# Patient Record
Sex: MALE | Race: WHITE | NOT HISPANIC OR LATINO | Employment: FULL TIME | URBAN - METROPOLITAN AREA
[De-identification: names, ages, dates, MRNs, and addresses within clinical notes are randomized per-mention and may not be internally consistent; named-entity substitution may affect disease eponyms.]

---

## 2017-01-09 ENCOUNTER — GENERIC CONVERSION - ENCOUNTER (OUTPATIENT)
Dept: OTHER | Facility: OTHER | Age: 22
End: 2017-01-09

## 2017-02-07 ENCOUNTER — ALLSCRIPTS OFFICE VISIT (OUTPATIENT)
Dept: OTHER | Facility: OTHER | Age: 22
End: 2017-02-07

## 2017-02-07 LAB
FLUAV AG SPEC QL IA: NEGATIVE
INFLUENZA B AG (HISTORICAL): NEGATIVE

## 2017-06-26 ENCOUNTER — ALLSCRIPTS OFFICE VISIT (OUTPATIENT)
Dept: OTHER | Facility: OTHER | Age: 22
End: 2017-06-26

## 2017-08-21 ENCOUNTER — GENERIC CONVERSION - ENCOUNTER (OUTPATIENT)
Dept: OTHER | Facility: OTHER | Age: 22
End: 2017-08-21

## 2017-09-06 ENCOUNTER — ALLSCRIPTS OFFICE VISIT (OUTPATIENT)
Dept: OTHER | Facility: OTHER | Age: 22
End: 2017-09-06

## 2017-09-06 LAB — S PYO AG THROAT QL: NEGATIVE

## 2018-01-13 VITALS
DIASTOLIC BLOOD PRESSURE: 72 MMHG | OXYGEN SATURATION: 98 % | BODY MASS INDEX: 21.34 KG/M2 | HEART RATE: 81 BPM | SYSTOLIC BLOOD PRESSURE: 122 MMHG | WEIGHT: 153 LBS | TEMPERATURE: 98.3 F

## 2018-01-14 VITALS
HEART RATE: 89 BPM | RESPIRATION RATE: 15 BRPM | DIASTOLIC BLOOD PRESSURE: 70 MMHG | WEIGHT: 144 LBS | HEIGHT: 71 IN | BODY MASS INDEX: 20.16 KG/M2 | SYSTOLIC BLOOD PRESSURE: 126 MMHG | TEMPERATURE: 97.6 F

## 2018-01-14 VITALS
HEIGHT: 71 IN | TEMPERATURE: 99 F | SYSTOLIC BLOOD PRESSURE: 120 MMHG | DIASTOLIC BLOOD PRESSURE: 78 MMHG | WEIGHT: 155 LBS | OXYGEN SATURATION: 97 % | HEART RATE: 98 BPM | BODY MASS INDEX: 21.7 KG/M2 | RESPIRATION RATE: 16 BRPM

## 2018-12-03 ENCOUNTER — OFFICE VISIT (OUTPATIENT)
Dept: FAMILY MEDICINE CLINIC | Facility: OTHER | Age: 23
End: 2018-12-03
Payer: COMMERCIAL

## 2018-12-03 VITALS
WEIGHT: 152.44 LBS | DIASTOLIC BLOOD PRESSURE: 84 MMHG | HEART RATE: 64 BPM | BODY MASS INDEX: 20.65 KG/M2 | OXYGEN SATURATION: 98 % | SYSTOLIC BLOOD PRESSURE: 134 MMHG | HEIGHT: 72 IN | TEMPERATURE: 97.4 F

## 2018-12-03 DIAGNOSIS — J02.9 SORE THROAT: ICD-10-CM

## 2018-12-03 DIAGNOSIS — J06.9 VIRAL UPPER RESPIRATORY TRACT INFECTION: Primary | ICD-10-CM

## 2018-12-03 LAB — S PYO AG THROAT QL: NEGATIVE

## 2018-12-03 PROCEDURE — 3008F BODY MASS INDEX DOCD: CPT | Performed by: NURSE PRACTITIONER

## 2018-12-03 PROCEDURE — 99213 OFFICE O/P EST LOW 20 MIN: CPT | Performed by: NURSE PRACTITIONER

## 2018-12-03 PROCEDURE — 87880 STREP A ASSAY W/OPTIC: CPT | Performed by: NURSE PRACTITIONER

## 2018-12-03 NOTE — PROGRESS NOTES
Assessment/Plan:           Problem List Items Addressed This Visit     None      Visit Diagnoses     Viral upper respiratory tract infection       Sore throat      --Advise rest, fluids, gargles, lozenges, Motrin, OTC expectorant  --Declines Rx cough suppressant  --Call for no improvement/worsening over the next 5-7 days    Relevant Orders    POCT rapid strepA (Completed)-->negative            Subjective:      Patient ID: Marnie Choi is a 26 y o  male  Sore throat, mildly productive cough since yesterday  No fever, nasal congestion, rhinorrhea, headache, body aches  Normal appetite  No N/V/D, abdominal pain  No wheezing, chest tightness  No OTC meds taken  Co-worker sick with URI symptoms x 1 month  No other known sick contacts  No flu shot  Will be graduating from [employer] in a couple weeks with criminal justice degree  The following portions of the patient's history were reviewed and updated as appropriate: allergies, current medications, past family history, past medical history, past social history, past surgical history and problem list     Review of Systems   Constitutional: Negative for fever  HENT: Positive for sore throat  Negative for rhinorrhea  Respiratory: Positive for cough  Negative for shortness of breath and wheezing  Cardiovascular: Negative for chest pain  Gastrointestinal: Negative for abdominal pain, diarrhea, nausea and vomiting  Musculoskeletal: Negative for myalgias  Neurological: Negative for headaches  Objective:      /88 (BP Location: Right arm, Patient Position: Sitting, Cuff Size: Adult)   Pulse 64   Temp (!) 97 4 °F (36 3 °C) (Tympanic)   Ht 5' 11 6" (1 819 m)   Wt 69 1 kg (152 lb 7 oz)   SpO2 98%   BMI 20 91 kg/m²          Physical Exam   Constitutional: He is oriented to person, place, and time  He appears well-developed and well-nourished  No distress  HENT:   Head: Normocephalic and atraumatic     Right Ear: External ear normal    Left Ear: External ear normal    Nose: Nose normal    Mouth/Throat: No oropharyngeal exudate  Tonsils 1-2 +, mildly erythematous without exudate  Eyes: Pupils are equal, round, and reactive to light  Conjunctivae are normal  Right eye exhibits no discharge  Left eye exhibits no discharge  Neck: Normal range of motion  Neck supple  Cardiovascular: Normal rate, regular rhythm and normal heart sounds  Pulmonary/Chest: Effort normal and breath sounds normal  No respiratory distress  He has no wheezes  He has no rales  Abdominal: Soft  Bowel sounds are normal  There is no tenderness  Lymphadenopathy:     He has no cervical adenopathy  Neurological: He is alert and oriented to person, place, and time  Skin: Skin is warm and dry  No rash noted  He is not diaphoretic  Psychiatric: He has a normal mood and affect

## 2018-12-03 NOTE — PATIENT INSTRUCTIONS
Upper Respiratory Infection, Ambulatory Care   GENERAL INFORMATION:   An upper respiratory infection  is also called a common cold  It can affect your nose, throat, ears, and sinuses  Common symptoms include the following:   · Runny or stuffy nose    · Sneezing and coughing    · Sore throat or hoarseness    · Red, watery, and sore eyes    · Tiredness or restlessness    · Chills and fever    · Headache, body aches, or sore muscles  Seek immediate care for the following symptoms:   · Headaches or a stiff neck    · Bright lights hurt your eyes    · Chest pain or trouble breathing  Treatment for an upper respiratory infection  may include any of the following:  · Decongestants  help decrease nasal congestion and improve your breathing  Do not use decongestant sprays for more than a few days  · Cough suppressants  help decrease coughing  Ask your healthcare provider which type of cough medicine is best for you  Some cough medicines need a doctor's order  · NSAIDs  help decrease swelling and pain or fever  This medicine is available with or without a doctor's order  NSAIDs can cause stomach bleeding or kidney problems in certain people  If you take blood thinner medicine, always ask your healthcare provider if NSAIDs are safe for you  Always read the medicine label and follow directions  Care for an upper respiratory infection:   · Rest  until your fever is gone or you feel better  · Drink liquids as directed to prevent dehydration  You may need to drink 8 to 10 cups of liquid each day  Good liquids to drink include water, ginger ale, tea, or fruit juices  · Gargle  with warm salt water to help your sore throat feel better  Mix ¼ teaspoon salt with 1 cup warm water  You may also suck on hard candy or throat lozenges  · Saline nasal drops  help loosen your nasal congestion  They can be bought without a doctor's order      · Take a warm bath or shower  to help decrease body aches and help you breathe easier  · Use a cool-mist humidifier  to increase air moisture and make it easier for you to breathe  Prevent the spread of germs:   · Avoid others for the first 2 to 3 days of your cold  Germs are easily spread during this time  · Do not share food, drinks,  towels, or personal items with others  · Wash your hands often  Use soap and water  Wash your hands after you use the bathroom, change a child's diapers, or sneeze  Wash your hands before you prepare or eat food  Cover your mouth and nose with a tissue when you sneeze or cough  Follow up with your healthcare provider as directed:  Write down your questions so you remember to ask them during your visits  CARE AGREEMENT:   You have the right to help plan your care  Learn about your health condition and how it may be treated  Discuss treatment options with your caregivers to decide what care you want to receive  You always have the right to refuse treatment  The above information is an  only  It is not intended as medical advice for individual conditions or treatments  Talk to your doctor, nurse or pharmacist before following any medical regimen to see if it is safe and effective for you  © 2014 [address] is for End User's use only and may not be sold, redistributed or otherwise used for commercial purposes  All illustrations and images included in CareNotes® are the copyrighted property of [employer]  or Kevin Lamar

## 2019-08-30 ENCOUNTER — OFFICE VISIT (OUTPATIENT)
Dept: FAMILY MEDICINE CLINIC | Facility: OTHER | Age: 24
End: 2019-08-30
Payer: COMMERCIAL

## 2019-08-30 VITALS
DIASTOLIC BLOOD PRESSURE: 82 MMHG | SYSTOLIC BLOOD PRESSURE: 120 MMHG | BODY MASS INDEX: 20.78 KG/M2 | HEIGHT: 72 IN | OXYGEN SATURATION: 98 % | WEIGHT: 153.4 LBS | TEMPERATURE: 97.9 F | HEART RATE: 64 BPM

## 2019-08-30 DIAGNOSIS — Z23 ENCOUNTER FOR IMMUNIZATION: ICD-10-CM

## 2019-08-30 DIAGNOSIS — Z00.00 WELL ADULT EXAM: Primary | ICD-10-CM

## 2019-08-30 PROCEDURE — 90471 IMMUNIZATION ADMIN: CPT | Performed by: NURSE PRACTITIONER

## 2019-08-30 PROCEDURE — 90715 TDAP VACCINE 7 YRS/> IM: CPT | Performed by: NURSE PRACTITIONER

## 2019-08-30 PROCEDURE — 99395 PREV VISIT EST AGE 18-39: CPT | Performed by: NURSE PRACTITIONER

## 2019-08-30 RX ORDER — PREDNISONE 10 MG/1
TABLET ORAL
COMMUNITY
Start: 2019-08-04 | End: 2019-08-30

## 2019-08-30 NOTE — PROGRESS NOTES
Assessment/Plan:         Problem List Items Addressed This Visit     None      Visit Diagnoses     Well adult exam     --Fairly healthy 22year old male  --Physical agility form completed-->no limitations/restrictions  --Encouraged cessation of vaping  Potential health issues discussed  He will consider  --Offered slip for baseline screening labs which he is declining  --Tdap booster given      Encounter for immunization        Relevant Orders    TDAP VACCINE GREATER THAN OR EQUAL TO 6YO IM (Completed)            Subjective:      Patient ID: Curt Ramirez is a 22 y o  male  Here for routine well exam      No complaints or issues  Graduated from [employer] this past winter--criminal justice degree  Living at home with parents  Plans on getting apartment with college friend eventually  In the process of applying for position with [employer]  Will be taking physical agility test tomorrow  Needs form completed  Test involves going up stairs, lifting 100 pounds, other basic cardio/muscle activities  He does not anticipate having any issues with anything  Denies recent injuries or physical limitations  Wrist injury when younger  Geovanny now  Works out regularly--mix of cardio and [employer]  Good stamina  Denies exertional dyspnea, CP, dizziness, palpitations  No history of asthma  Rates his diet as "average"  Eats wide variety including regular fruits and vegetables  Some yogurt and cheese  Drinks water, Gatorade  No caffeine  Couple of beers on the weekend  No other alcohol  Vapes (nicotine only), but trying to cut down, with the goal of eventually quitting  No cigarettes  No recreational drug use  Sexually active  Has steady girlfriend  No issues with STI's  Uses protection  No mood issues  Denies feeling down/depressed  No sleep issues including apnea  FH reviewed  Mother healthy    Father with CAD/valve disease (no MI; doing OK); sister recently diagnosed with seizures  He had one seizure after giving blood in 5th grade, no activity since  Was subsequently cleared by neurologist      No vision or hearing issues  Tdap 2007  The following portions of the patient's history were reviewed and updated as appropriate: current medications, past family history, past medical history, past social history, past surgical history and problem list     Review of Systems   Constitutional: Negative for fatigue and fever  HENT: Negative for sore throat  Eyes: Negative for visual disturbance  Respiratory: Negative for cough and shortness of breath  Cardiovascular: Negative for chest pain and palpitations  Gastrointestinal: Negative for abdominal pain, constipation, diarrhea and vomiting  Genitourinary: Negative for difficulty urinating and dysuria  Musculoskeletal: Negative for arthralgias and gait problem  Skin: Negative for rash  Neurological: Negative for dizziness and headaches  Psychiatric/Behavioral: Negative for dysphoric mood  Objective:      /82 (BP Location: Left arm, Patient Position: Sitting, Cuff Size: Adult)   Pulse 64   Temp 97 9 °F (36 6 °C) (Tympanic)   Ht 5' 11 5" (1 816 m)   Wt 69 6 kg (153 lb 6 4 oz)   SpO2 98%   BMI 21 10 kg/m²          Physical Exam   Constitutional: He is oriented to person, place, and time  He appears well-developed and well-nourished  HENT:   Head: Normocephalic and atraumatic  Right Ear: External ear normal    Left Ear: External ear normal    Nose: Nose normal    Mouth/Throat: Oropharynx is clear and moist    Eyes: Pupils are equal, round, and reactive to light  Conjunctivae are normal    Neck: Normal range of motion  Neck supple  Cardiovascular: Normal rate, regular rhythm, normal heart sounds and intact distal pulses  Pulmonary/Chest: Effort normal and breath sounds normal    Abdominal: Soft  Bowel sounds are normal  There is no tenderness   Hernia confirmed negative in the right inguinal area and confirmed negative in the left inguinal area  Genitourinary: Testes normal and penis normal    Lymphadenopathy:     He has no cervical adenopathy  Neurological: He is alert and oriented to person, place, and time  He has normal reflexes  Skin: Skin is warm and dry  Psychiatric: He has a normal mood and affect

## 2019-08-30 NOTE — PATIENT INSTRUCTIONS
Wellness Visit for Adults   WHAT YOU NEED TO KNOW:   What is a wellness visit? A wellness visit is when you see your healthcare provider to get screened for health problems  You can also get advice on how to stay healthy  Write down your questions so you remember to ask them  Ask your healthcare provider how often you should have a wellness visit  What happens at a wellness visit? Your healthcare provider will ask about your health, and your family history of health problems  This includes high blood pressure, heart disease, and cancer  He or she will ask if you have symptoms that concern you, if you smoke, and about your mood  You may also be asked about your intake of medicines, supplements, food, and alcohol  Any of the following may be done:  · Your weight  will be checked  Your height may also be checked so your body mass index (BMI) can be calculated  Your BMI shows if you are at a healthy weight  · Your blood pressure  and heart rate will be checked  Your temperature may also be checked  · Blood and urine tests  may be done  Blood tests may be done to check your cholesterol levels  Abnormal cholesterol levels increase your risk for heart disease and stroke  You may also need a blood or urine test to check for diabetes if you are at increased risk  Urine tests may be done to look for signs of an infection or kidney disease  · A physical exam  includes checking your heartbeat and lungs with a stethoscope  Your healthcare provider may also check your skin to look for sun damage  · Screening tests  may be recommended  A screening test is done to check for diseases that may not cause symptoms  The screening tests you may need depend on your age, gender, family history, and lifestyle habits  For example, colorectal screening may be recommended if you are 48years old or older  What screening tests do I need if I am a woman? · A Pap smear  is used to screen for cervical cancer   Pap smears are usually done every 3 to 5 years depending on your age  You may need them more often if you have had abnormal Pap smear test results in the past  Ask your healthcare provider how often you should have a Pap smear  · A mammogram  is an x-ray of your breasts to screen for breast cancer  Experts recommend mammograms every 2 years starting at age 48 years  You may need a mammogram at age 52 years or younger if you have an increased risk for breast cancer  Talk to your healthcare provider about when you should start having mammograms and how often you need them  What vaccines might I need? · Get an influenza vaccine  every year  The influenza vaccine protects you from the flu  Several types of viruses cause the flu  The viruses change over time, so new vaccines are made each year  · Get a tetanus-diphtheria (Td) booster vaccine  every 10 years  This vaccine protects you against tetanus and diphtheria  Tetanus is a severe infection that may cause painful muscle spasms and lockjaw  Diphtheria is a severe bacterial infection that causes a thick covering in the back of your mouth and throat  · Get a human papillomavirus (HPV) vaccine  if you are female and aged 23 to 32 or male 23 to 24 and never received it  This vaccine protects you from HPV infection  HPV is the most common infection spread by sexual contact  HPV may also cause vaginal, penile, and anal cancers  · Get a pneumococcal vaccine  if you are aged 72 years or older  The pneumococcal vaccine is an injection given to protect you from pneumococcal disease  Pneumococcal disease is an infection caused by pneumococcal bacteria  The infection may cause pneumonia, meningitis, or an ear infection  · Get a shingles vaccine  if you are aged 61 or older, even if you have had shingles before  The shingles vaccine is an injection to protect you from the varicella-zoster virus  This is the same virus that causes chickenpox   Shingles is a painful rash that develops in people who had chickenpox or have been exposed to the virus  How can I eat healthy? My Plate is a model for planning healthy meals  It shows the types and amounts of foods that should go on your plate  Fruits and vegetables make up about half of your plate, and grains and protein make up the other half  A serving of dairy is included on the side of your plate  The amount of calories and serving sizes you need depends on your age, gender, weight, and height  Examples of healthy foods are listed below:  · Eat a variety of vegetables  such as dark green, red, and orange vegetables  You can also include canned vegetables low in sodium (salt) and frozen vegetables without added butter or sauces  · Eat a variety of fresh fruits , canned fruit in 100% juice, frozen fruit, and dried fruit  · Include whole grains  At least half of the grains you eat should be whole grains  Examples include whole-wheat bread, wheat pasta, brown rice, and whole-grain cereals such as oatmeal     · Eat a variety of protein foods such as seafood (fish and shellfish), lean meat, and poultry without skin (turkey and chicken)  Examples of lean meats include pork leg, shoulder, or tenderloin, and beef round, sirloin, tenderloin, and extra lean ground beef  Other protein foods include eggs and egg substitutes, beans, peas, soy products, nuts, and seeds  · Choose low-fat dairy products such as skim or 1% milk or low-fat yogurt, cheese, and cottage cheese  · Limit unhealthy fats  such as butter, hard margarine, and shortening  How much exercise do I need? Exercise at least 30 minutes per day on most days of the week  Some examples of exercise include walking, biking, dancing, and swimming  You can also fit in more physical activity by taking the stairs instead of the elevator or parking farther away from stores  Include muscle strengthening activities 2 days each week  Regular exercise provides many health benefits  It helps you manage your weight, and decreases your risk for type 2 diabetes, heart disease, stroke, and high blood pressure  Exercise can also help improve your mood  Ask your healthcare provider about the best exercise plan for you  What are some general health and safety guidelines I should follow? · Do not smoke  Nicotine and other chemicals in cigarettes and cigars can cause lung damage  Ask your healthcare provider for information if you currently smoke and need help to quit  E-cigarettes or smokeless tobacco still contain nicotine  Talk to your healthcare provider before you use these products  · Limit alcohol  A drink of alcohol is 12 ounces of beer, 5 ounces of wine, or 1½ ounces of liquor  · Lose weight, if needed  Being overweight increases your risk of certain health conditions  These include heart disease, high blood pressure, type 2 diabetes, and certain types of cancer  · Protect your skin  Do not sunbathe or use tanning beds  Use sunscreen with a SPF 15 or higher  Apply sunscreen at least 15 minutes before you go outside  Reapply sunscreen every 2 hours  Wear protective clothing, hats, and sunglasses when you are outside  · Drive safely  Always wear your seatbelt  Make sure everyone in your car wears a seatbelt  A seatbelt can save your life if you are in an accident  Do not use your cell phone when you are driving  This could distract you and cause an accident  Pull over if you need to make a call or send a text message  · Practice safe sex  Use latex condoms if are sexually active and have more than one partner  Your healthcare provider may recommend screening tests for sexually transmitted infections (STIs)  · Wear helmets, lifejackets, and protective gear  Always wear a helmet when you ride a bike or motorcycle, go skiing, or play sports that could cause a head injury  Wear protective equipment when you play sports   Wear a lifejacket when you are on a boat or doing water sports  CARE AGREEMENT:   You have the right to help plan your care  Learn about your health condition and how it may be treated  Discuss treatment options with your caregivers to decide what care you want to receive  You always have the right to refuse treatment  The above information is an  only  It is not intended as medical advice for individual conditions or treatments  Talk to your doctor, nurse or pharmacist before following any medical regimen to see if it is safe and effective for you  © 2017 [address] is for End User's use only and may not be sold, redistributed or otherwise used for commercial purposes  All illustrations and images included in CareNotes® are the copyrighted property of [employer]  or Kevin Lamar

## 2020-09-26 ENCOUNTER — HOSPITAL ENCOUNTER (EMERGENCY)
Facility: HOSPITAL | Age: 25
Discharge: HOME | End: 2020-09-26
Attending: EMERGENCY MEDICINE
Payer: OTHER MISCELLANEOUS

## 2020-09-26 VITALS
OXYGEN SATURATION: 100 % | DIASTOLIC BLOOD PRESSURE: 97 MMHG | RESPIRATION RATE: 16 BRPM | TEMPERATURE: 98.7 F | HEART RATE: 62 BPM | HEIGHT: 72 IN | SYSTOLIC BLOOD PRESSURE: 165 MMHG | BODY MASS INDEX: 23.32 KG/M2 | WEIGHT: 172.18 LBS

## 2020-09-26 DIAGNOSIS — R11.2 NAUSEA & VOMITING: Primary | ICD-10-CM

## 2020-09-26 PROCEDURE — 99284 EMERGENCY DEPT VISIT MOD MDM: CPT | Performed by: PHYSICIAN ASSISTANT

## 2020-09-26 PROCEDURE — 99283 EMERGENCY DEPT VISIT LOW MDM: CPT

## 2020-09-26 RX ORDER — ONDANSETRON 4 MG/1
4 TABLET, ORALLY DISINTEGRATING ORAL EVERY 6 HOURS PRN
Qty: 12 TABLET | Refills: 0 | Status: SHIPPED | OUTPATIENT
Start: 2020-09-26

## 2020-12-28 ENCOUNTER — TELEPHONE (OUTPATIENT)
Dept: FAMILY MEDICINE CLINIC | Facility: OTHER | Age: 25
End: 2020-12-28

## 2020-12-28 ENCOUNTER — APPOINTMENT (EMERGENCY)
Dept: RADIOLOGY | Facility: HOSPITAL | Age: 25
End: 2020-12-28
Payer: COMMERCIAL

## 2020-12-28 ENCOUNTER — HOSPITAL ENCOUNTER (EMERGENCY)
Facility: HOSPITAL | Age: 25
Discharge: HOME | End: 2020-12-28
Attending: EMERGENCY MEDICINE | Admitting: EMERGENCY MEDICINE
Payer: COMMERCIAL

## 2020-12-28 VITALS
RESPIRATION RATE: 17 BRPM | DIASTOLIC BLOOD PRESSURE: 69 MMHG | BODY MASS INDEX: 21.62 KG/M2 | WEIGHT: 159.6 LBS | HEIGHT: 72 IN | TEMPERATURE: 98.1 F | OXYGEN SATURATION: 97 % | HEART RATE: 61 BPM | SYSTOLIC BLOOD PRESSURE: 135 MMHG

## 2020-12-28 DIAGNOSIS — Z03.818 ENCOUNTER FOR OBSERVATION FOR SUSPECTED EXPOSURE TO OTHER BIOLOGICAL AGENTS RULED OUT: ICD-10-CM

## 2020-12-28 DIAGNOSIS — B34.9 VIRAL INFECTION, UNSPECIFIED: ICD-10-CM

## 2020-12-28 DIAGNOSIS — R07.9 CHEST PAIN: Primary | ICD-10-CM

## 2020-12-28 DIAGNOSIS — R19.7 DIARRHEA: ICD-10-CM

## 2020-12-28 LAB
ANION GAP SERPL CALCULATED.3IONS-SCNC: 8 MMOL/L (ref 4–13)
BASOPHILS # BLD AUTO: 0.04 THOUSANDS/ΜL (ref 0–0.1)
BASOPHILS NFR BLD AUTO: 1 % (ref 0–1)
BUN SERPL-MCNC: 15 MG/DL (ref 6–20)
CALCIUM SERPL-MCNC: 9.9 MG/DL (ref 8.4–10.2)
CHLORIDE SERPL-SCNC: 101 MMOL/L (ref 96–108)
CO2 SERPL-SCNC: 29 MMOL/L (ref 22–33)
CREAT SERPL-MCNC: 1.08 MG/DL (ref 0.5–1.2)
EOSINOPHIL # BLD AUTO: 0.13 THOUSAND/ΜL (ref 0–0.61)
EOSINOPHIL NFR BLD AUTO: 2 % (ref 0–6)
ERYTHROCYTE [DISTWIDTH] IN BLOOD BY AUTOMATED COUNT: 13.2 % (ref 11.6–15.1)
GFR SERPL CREATININE-BSD FRML MDRD: 96 ML/MIN/1.73SQ M
GLUCOSE SERPL-MCNC: 94 MG/DL (ref 65–140)
HCT VFR BLD AUTO: 47.7 % (ref 36.5–49.3)
HGB BLD-MCNC: 16.4 G/DL (ref 12–17)
IMM GRANULOCYTES # BLD AUTO: 0 THOUSAND/UL (ref 0–0.2)
IMM GRANULOCYTES NFR BLD AUTO: 0 % (ref 0–2)
LYMPHOCYTES # BLD AUTO: 1.43 THOUSANDS/ΜL (ref 0.6–4.47)
LYMPHOCYTES NFR BLD AUTO: 22 % (ref 14–44)
MCH RBC QN AUTO: 29.6 PG (ref 26.8–34.3)
MCHC RBC AUTO-ENTMCNC: 34.4 G/DL (ref 31.4–37.4)
MCV RBC AUTO: 86 FL (ref 82–98)
MONOCYTES # BLD AUTO: 0.6 THOUSAND/ΜL (ref 0.17–1.22)
MONOCYTES NFR BLD AUTO: 9 % (ref 4–12)
NEUTROPHILS # BLD AUTO: 4.31 THOUSANDS/ΜL (ref 1.85–7.62)
NEUTS SEG NFR BLD AUTO: 66 % (ref 43–75)
PLATELET # BLD AUTO: 214 THOUSANDS/UL (ref 149–390)
PMV BLD AUTO: 12 FL (ref 8.9–12.7)
POTASSIUM SERPL-SCNC: 4.1 MMOL/L (ref 3.5–5)
RBC # BLD AUTO: 5.54 MILLION/UL (ref 3.88–5.62)
SODIUM SERPL-SCNC: 138 MMOL/L (ref 133–145)
TROPONIN I SERPL-MCNC: <0.03 NG/ML (ref 0–0.07)
WBC # BLD AUTO: 6.51 THOUSAND/UL (ref 4.31–10.16)

## 2020-12-28 PROCEDURE — 71046 X-RAY EXAM CHEST 2 VIEWS: CPT

## 2020-12-28 PROCEDURE — 80048 BASIC METABOLIC PNL TOTAL CA: CPT | Performed by: EMERGENCY MEDICINE

## 2020-12-28 PROCEDURE — 96375 TX/PRO/DX INJ NEW DRUG ADDON: CPT

## 2020-12-28 PROCEDURE — 36415 COLL VENOUS BLD VENIPUNCTURE: CPT | Performed by: EMERGENCY MEDICINE

## 2020-12-28 PROCEDURE — 96374 THER/PROPH/DIAG INJ IV PUSH: CPT

## 2020-12-28 PROCEDURE — 96361 HYDRATE IV INFUSION ADD-ON: CPT

## 2020-12-28 PROCEDURE — 99285 EMERGENCY DEPT VISIT HI MDM: CPT | Performed by: EMERGENCY MEDICINE

## 2020-12-28 PROCEDURE — 93005 ELECTROCARDIOGRAM TRACING: CPT

## 2020-12-28 PROCEDURE — 84484 ASSAY OF TROPONIN QUANT: CPT | Performed by: EMERGENCY MEDICINE

## 2020-12-28 PROCEDURE — 85025 COMPLETE CBC W/AUTO DIFF WBC: CPT | Performed by: EMERGENCY MEDICINE

## 2020-12-28 PROCEDURE — 99285 EMERGENCY DEPT VISIT HI MDM: CPT

## 2020-12-28 RX ORDER — ONDANSETRON 2 MG/ML
4 INJECTION INTRAMUSCULAR; INTRAVENOUS ONCE
Status: COMPLETED | OUTPATIENT
Start: 2020-12-28 | End: 2020-12-28

## 2020-12-28 RX ORDER — KETOROLAC TROMETHAMINE 30 MG/ML
30 INJECTION, SOLUTION INTRAMUSCULAR; INTRAVENOUS ONCE
Status: COMPLETED | OUTPATIENT
Start: 2020-12-28 | End: 2020-12-28

## 2020-12-28 RX ORDER — SODIUM CHLORIDE 9 MG/ML
3 INJECTION INTRAVENOUS
Status: DISCONTINUED | OUTPATIENT
Start: 2020-12-28 | End: 2020-12-28 | Stop reason: HOSPADM

## 2020-12-28 RX ADMIN — SODIUM CHLORIDE 1000 ML: 0.9 INJECTION, SOLUTION INTRAVENOUS at 13:40

## 2020-12-28 RX ADMIN — ONDANSETRON 4 MG: 2 INJECTION INTRAMUSCULAR; INTRAVENOUS at 13:40

## 2020-12-28 RX ADMIN — KETOROLAC TROMETHAMINE 30 MG: 30 INJECTION, SOLUTION INTRAMUSCULAR at 13:40

## 2020-12-30 LAB
ATRIAL RATE: 56 BPM
P AXIS: -12 DEGREES
PR INTERVAL: 133 MS
QRS AXIS: 81 DEGREES
QRSD INTERVAL: 92 MS
QT INTERVAL: 384 MS
QTC INTERVAL: 371 MS
T WAVE AXIS: 60 DEGREES
VENTRICULAR RATE: 56 BPM

## 2020-12-30 PROCEDURE — 93010 ELECTROCARDIOGRAM REPORT: CPT | Performed by: INTERNAL MEDICINE

## 2022-08-30 ENCOUNTER — HOSPITAL ENCOUNTER (EMERGENCY)
Facility: HOSPITAL | Age: 27
Discharge: HOME | End: 2022-08-30
Attending: EMERGENCY MEDICINE
Payer: COMMERCIAL

## 2022-08-30 VITALS
RESPIRATION RATE: 18 BRPM | OXYGEN SATURATION: 100 % | SYSTOLIC BLOOD PRESSURE: 159 MMHG | DIASTOLIC BLOOD PRESSURE: 92 MMHG | HEART RATE: 75 BPM | TEMPERATURE: 98.2 F

## 2022-08-30 DIAGNOSIS — N48.89 PENILE PAIN: Primary | ICD-10-CM

## 2022-08-30 LAB
BILIRUB UR QL STRIP: NEGATIVE
C TRACH DNA SPEC QL NAA+PROBE: NEGATIVE
CLARITY UR: CLEAR
COLOR UR: YELLOW
GLUCOSE UR STRIP-MCNC: NEGATIVE MG/DL
HGB UR QL STRIP.AUTO: NEGATIVE
KETONES UR STRIP-MCNC: ABNORMAL MG/DL
LEUKOCYTE ESTERASE UR QL STRIP: NEGATIVE
N GONORRHOEA DNA SPEC QL NAA+PROBE: NEGATIVE
NITRITE UR QL STRIP: NEGATIVE
PH UR STRIP.AUTO: 6 [PH]
PROT UR STRIP-MCNC: NEGATIVE MG/DL
SP GR UR STRIP.AUTO: >=1.03 (ref 1–1.03)
UROBILINOGEN UR QL STRIP.AUTO: 0.2 E.U./DL

## 2022-08-30 PROCEDURE — 87491 CHLMYD TRACH DNA AMP PROBE: CPT | Performed by: EMERGENCY MEDICINE

## 2022-08-30 PROCEDURE — 99284 EMERGENCY DEPT VISIT MOD MDM: CPT | Performed by: EMERGENCY MEDICINE

## 2022-08-30 PROCEDURE — 99283 EMERGENCY DEPT VISIT LOW MDM: CPT

## 2022-08-30 PROCEDURE — 81003 URINALYSIS AUTO W/O SCOPE: CPT | Performed by: EMERGENCY MEDICINE

## 2022-08-30 PROCEDURE — 87591 N.GONORRHOEAE DNA AMP PROB: CPT | Performed by: EMERGENCY MEDICINE

## 2022-08-30 NOTE — DISCHARGE INSTRUCTIONS
Follow up with urology and with your primary doctor, and return to the emergency department for new or worsening symptoms

## 2022-08-30 NOTE — ED PROVIDER NOTES
History  Chief Complaint   Patient presents with    Penis / Scrotum Problem     Pt states he went to urgent care for a penile lesion and was - for gonorrhea, chylmydia and a uti  Pt states he took 10 days of an antiobiotic and is still having irritation on the tip of his penis  Patient is a 27-year-old male seen in the emergency department with concern for penile discomfort/skin changes over approximately the past 1-2 weeks  Patient states that he was seen at urgent care earlier this month, and apparently was treated with ceftriaxone and doxycycline  Patient states that he tested negative for gonorrhea/chlamydia at that time  Patient states he noticed a white spot and red spot on his penis within the past several days, which has been improving  Patient states that he did notes some penile discomfort, now improved  Patient notes no fever or urethral discharge  Patient states that he has not been sexually active for several months  Patient notes no difficulty with urination  None       Past Medical History:   Diagnosis Date    Irregular heart beat     Viral warts     Resolved 8/6/2014        History reviewed  No pertinent surgical history  Family History   Problem Relation Age of Onset    Hypertension Father     Heart disease Father     Hyperlipidemia Father         Pure hypercholesterolemia     Colon cancer Maternal Grandfather      I have reviewed and agree with the history as documented  E-Cigarette/Vaping    E-Cigarette Use Current Every Day User      E-Cigarette/Vaping Substances    Nicotine Yes      Social History     Tobacco Use    Smoking status: Former Smoker    Smokeless tobacco: Former User    Tobacco comment: vaping   Vaping Use    Vaping Use: Every day    Substances: Nicotine   Substance Use Topics    Alcohol use: Yes     Comment: occational    Drug use: Yes     Types: Marijuana       Review of Systems   Constitutional: Negative for chills and fever     HENT: Negative for ear pain and sore throat  Eyes: Negative for pain and visual disturbance  Respiratory: Negative for cough and shortness of breath  Cardiovascular: Negative for chest pain and palpitations  Gastrointestinal: Negative for abdominal pain and vomiting  Genitourinary: Positive for penile pain  Negative for decreased urine volume and dysuria  Penile lesion   Musculoskeletal: Negative for arthralgias and back pain  Skin: Negative for color change and rash  Neurological: Negative for seizures and syncope  Psychiatric/Behavioral: Negative for agitation and confusion  Physical Exam  Physical Exam  Vitals and nursing note reviewed  Constitutional:       General: He is not in acute distress  Appearance: He is well-developed  HENT:      Head: Normocephalic and atraumatic  Right Ear: External ear normal       Left Ear: External ear normal       Nose: Nose normal       Mouth/Throat:      Pharynx: Oropharynx is clear  Eyes:      General: No scleral icterus  Conjunctiva/sclera: Conjunctivae normal    Cardiovascular:      Rate and Rhythm: Normal rate  Comments: well-perfused extremities  Pulmonary:      Effort: Pulmonary effort is normal  No respiratory distress  Abdominal:      General: There is no distension  Palpations: Abdomen is soft  Tenderness: There is no abdominal tenderness  Genitourinary:     Comments: (chaperoned by RN)- no external lesions or ulcerations; no urethral discharge noted; no scrotal swelling or tenderness noted  Musculoskeletal:         General: No deformity or signs of injury  Cervical back: Normal range of motion and neck supple  Skin:     General: Skin is warm and dry  Neurological:      Mental Status: He is alert  Cranial Nerves: No cranial nerve deficit  Sensory: No sensory deficit  Psychiatric:         Mood and Affect: Mood normal          Thought Content:  Thought content normal          Vital Signs  ED Triage Vitals [08/30/22 0042]   Temperature Pulse Respirations Blood Pressure SpO2   98 2 °F (36 8 °C) 75 18 159/92 100 %      Temp Source Heart Rate Source Patient Position - Orthostatic VS BP Location FiO2 (%)   Oral Monitor Sitting Left arm --      Pain Score       --           Vitals:    08/30/22 0042   BP: 159/92   Pulse: 75   Patient Position - Orthostatic VS: Sitting         Visual Acuity      ED Medications  Medications - No data to display    Diagnostic Studies  Results Reviewed     Procedure Component Value Units Date/Time    UA w Reflex to Microscopic w Reflex to Culture [[id number]]  (Abnormal) Collected: 08/30/22 0102    Lab Status: Final result Specimen: Urine, Clean Catch Updated: 08/30/22 0115     Color, UA Yellow     Clarity, UA Clear     Specific Gravity, UA >=1 030     pH, UA 6 0     Leukocytes, UA Negative     Nitrite, UA Negative     Protein, UA Negative mg/dl      Glucose, UA Negative mg/dl      Ketones, UA 40 (2+) mg/dl      Urobilinogen, UA 0 2 E U /dl      Bilirubin, UA Negative     Occult Blood, UA Negative    Chlamydia/GC amplified DNA by PCR [[id number]] Collected: 08/30/22 0102    Lab Status: In process Specimen: Urine, Other Updated: 08/30/22 0111                 No orders to display              Procedures  Procedures         ED Course                               SBIRT 20yo+    Flowsheet Row Most Recent Value   SBIRT (23 yo +)    In order to provide better care to our patients, we are screening all of our patients for alcohol and drug use  Would it be okay to ask you these screening questions? No Filed at: 08/30/2022 9411                    [facility]  Number of Diagnoses or Management Options  Penile pain  Diagnosis management comments: Patient is a 60-year-old male seen in the emergency department with concern for penile discomfort, possible skin changes  Urinalysis remarkable for ketones  Urinalysis is not consistent with urinary tract infection    Urine was sent for gonorrhea/chlamydia testing  No definite cause of the patient's symptoms was discovered  Patient is improving skin changes, could be due to resolving yeast infection of the skin versus skin irritation, as patient states that he wears spandex at work in a hot environment  Patient was instructed on wearing loose-fitting clothing  Plan to have patient follow up with PCP/urology(as needed)  Patient stable for discharge home  Discharge instructions were reviewed with patient  Amount and/or Complexity of Data Reviewed  Clinical lab tests: ordered and reviewed        Disposition  Final diagnoses:   Penile pain     Time reflects when diagnosis was documented in both MDM as applicable and the Disposition within this note     Time User Action Codes Description Comment    8/30/2022 12:56 AM Jude Nageotte Add [N48 [id number]] Penile pain       ED Disposition     ED Disposition   Discharge    Condition   Stable    Date/Time   Tue Aug 30, 2022 12:55 AM    Comment   Fran 56 discharge to home/self care  Follow-up Information     Follow up With Specialties Details Why Contact Info Additional [address] For Urology Susy [facility] U[facility] Call in 1 day  Porfirio Clinejesus [address] [facility] 85 [id number]  [phone]  [facility] ARNULFO Olvera [address] [place] [address] Susy Holbrook, [place], [id number] [phone]    Your primary doctor  Call in 1 day       [place] Call  As needed [address] [phone]  [id number]  , [facility] Susy Holbrook, [place], [address]          There are no discharge medications for this patient            PDMP Review     None          ED Provider  Electronically Signed by           Elva Gruber MD  08/30/22 9901

## 2022-09-04 ENCOUNTER — HOSPITAL ENCOUNTER (EMERGENCY)
Facility: HOSPITAL | Age: 27
Discharge: HOME | End: 2022-09-04
Attending: EMERGENCY MEDICINE
Payer: COMMERCIAL

## 2022-09-04 VITALS
HEART RATE: 90 BPM | TEMPERATURE: 98.6 F | RESPIRATION RATE: 20 BRPM | BODY MASS INDEX: 21.7 KG/M2 | OXYGEN SATURATION: 100 % | WEIGHT: 160 LBS | SYSTOLIC BLOOD PRESSURE: 142 MMHG | DIASTOLIC BLOOD PRESSURE: 96 MMHG

## 2022-09-04 DIAGNOSIS — R30.0 DYSURIA: Primary | ICD-10-CM

## 2022-09-04 LAB
BILIRUB UR QL STRIP: NEGATIVE
CLARITY UR: CLEAR
COLOR UR: ABNORMAL
GLUCOSE UR STRIP-MCNC: NEGATIVE MG/DL
HGB UR QL STRIP.AUTO: NEGATIVE
KETONES UR STRIP-MCNC: ABNORMAL MG/DL
LEUKOCYTE ESTERASE UR QL STRIP: NEGATIVE
NITRITE UR QL STRIP: NEGATIVE
PH UR STRIP.AUTO: 6 [PH]
PROT UR STRIP-MCNC: NEGATIVE MG/DL
SP GR UR STRIP.AUTO: 1.02 (ref 1–1.03)
UROBILINOGEN UR STRIP-ACNC: <2 MG/DL

## 2022-09-04 PROCEDURE — 99282 EMERGENCY DEPT VISIT SF MDM: CPT | Performed by: EMERGENCY MEDICINE

## 2022-09-04 PROCEDURE — 81003 URINALYSIS AUTO W/O SCOPE: CPT | Performed by: EMERGENCY MEDICINE

## 2022-09-04 PROCEDURE — 99283 EMERGENCY DEPT VISIT LOW MDM: CPT

## 2022-09-04 NOTE — DISCHARGE INSTRUCTIONS
You were seen in the ED for pain with urination  You had urine studies showing no significant abnormalities  You were diagnosed with nonspecific pain with urination  You have been discharged with a referral to Urology as an outpatient  Please follow up with your primary care physician within the next 1 week for continued management of your conditions  Thank you very much for utilizing the ED this afternoon

## 2022-09-04 NOTE — ED PROVIDER NOTES
History  Chief Complaint   Patient presents with    Possible UTI     Seen at [facility] for dysuria - never given urine results; took abx and now still painful     This is a 28-year-old male patient presented to the ED today for intermittent dysuria  Patient states that he occasionally has some pain with urination, but otherwise does not have any other significantly related symptoms  He has not had any discharge, has not had any erythema, has not had any lesions  He has been seen previously, as his symptoms have been going on for approximately 1 month  He was seen at urgent care, given doxycycline, Rocephin, and tested negative for gonorrhea and chlamydia subsequently  He also visited [facility] on the 30th of August and he tested negative for gonorrhea chlamydia again, and his urine did not show any evidence of infection either  Patient was referred to Urology, but has yet been unable to follow-up with them  He states that when he was masturbating last night, he felt the pain, and is mainly at the tip of his penis  He otherwise denies any significantly related symptoms  None       Past Medical History:   Diagnosis Date    Irregular heart beat     Viral warts     Resolved 8/6/2014        History reviewed  No pertinent surgical history  Family History   Problem Relation Age of Onset    Hypertension Father     Heart disease Father     Hyperlipidemia Father         Pure hypercholesterolemia     Colon cancer Maternal Grandfather      I have reviewed and agree with the history as documented  E-Cigarette/Vaping    E-Cigarette Use Current Every Day User      E-Cigarette/Vaping Substances    Nicotine Yes      Social History     Tobacco Use    Smoking status: Former Smoker    Smokeless tobacco: Former User    Tobacco comment: vaping   Vaping Use    Vaping Use: Every day    Substances: Nicotine   Substance Use Topics    Alcohol use: Yes     Comment: occational    Drug use:  Yes Types: Marijuana       Review of Systems   Constitutional: Negative for chills and fever  HENT: Negative for ear pain and sore throat  Eyes: Negative for pain and visual disturbance  Respiratory: Negative for cough and shortness of breath  Cardiovascular: Negative for chest pain and palpitations  Gastrointestinal: Negative for abdominal pain and vomiting  Genitourinary: Negative for dysuria and hematuria  Musculoskeletal: Negative for arthralgias and back pain  Skin: Negative for color change and rash  Neurological: Negative for seizures and syncope  All other systems reviewed and are negative  Physical Exam  Physical Exam  Vitals and nursing note reviewed  Exam conducted with a chaperone present  Constitutional:       General: He is not in acute distress  Appearance: Normal appearance  He is well-developed and normal weight  HENT:      Head: Normocephalic and atraumatic  Nose: Nose normal  No rhinorrhea  Mouth/Throat:      Mouth: Mucous membranes are moist       Pharynx: Oropharynx is clear  Eyes:      General: No scleral icterus  Conjunctiva/sclera: Conjunctivae normal    Cardiovascular:      Rate and Rhythm: Normal rate  Pulses: Normal pulses  Pulmonary:      Effort: Pulmonary effort is normal  No respiratory distress  Abdominal:      General: Abdomen is flat  There is no distension  Palpations: Abdomen is soft  Tenderness: There is no abdominal tenderness  Genitourinary:     Penis: Normal        Testes: Normal       Comments: No lesions, erythema noted  No discharge noted  Circumcised  Musculoskeletal:      Cervical back: Normal range of motion and neck supple  Skin:     General: Skin is warm and dry  Capillary Refill: Capillary refill takes less than 2 seconds  Neurological:      General: No focal deficit present  Mental Status: He is alert and oriented to person, place, and time     Psychiatric:         Mood and Affect: Mood normal          Behavior: Behavior normal          Thought Content: Thought content normal          Judgment: Judgment normal          Vital Signs  ED Triage Vitals [09/04/22 1235]   Temperature Pulse Respirations Blood Pressure SpO2   98 6 °F (37 °C) (!) 106 18 (!) 175/95 98 %      Temp Source Heart Rate Source Patient Position - Orthostatic VS BP Location FiO2 (%)   Oral Monitor -- -- --      Pain Score       1           Vitals:    09/04/22 1235   BP: (!) 175/95   Pulse: (!) 106         Visual Acuity      ED Medications  Medications - No data to display    Diagnostic Studies  Results Reviewed     None                 No orders to display              Procedures  Procedures         ED Course                                             MDM  Number of Diagnoses or Management Options  Dysuria  Diagnosis management comments: This is a 64-year-old male patient presented to the ED today for intermittent dysuria  Patient states that he cannot predict when he will have this dysuria  He will have it, it will go way, and will come back again randomly  He states that last night after masturbating he had some dysuria, and mainly it was pain at the tip of his penis  He has not had any discharge  Has visited the ED/urgent care multiple times, has been treated for gonorrhea chlamydia, but has been testing negative for urinary tract infections as well as gonorrhea and chlamydia  His differential diagnosis includes:  Urinary tract infection versus STI versus yeast infection versus other mechanical dysfunction  Patient has had treatment, as well as testing x2 for gonorrhea chlamydia, I do not believe that we need to retest him here especially without him having symptoms of discharge or true infection  With his symptoms being intermittent, I am less inclined to think that it is a urinary tract infection, however he may have some intermittent dysuria with a UTI    We will test him, send his urine for culture, but he will need to follow-up with urology as an outpatient, likely for detrusor muscle dysfunction versus other mechanical irritation  Patient had a UA which was significant for ketones, but otherwise was unremarkable  I did speak with the patient,  him on the fact that he will need to follow-up with urology as an outpatient, for definitive diagnosis and management  Patient was referred to urologist palpation  He also may have some low-level chronic volume depletion, causing him to have ketones in his urine which can potentially cause some urethral irritation  Patient was instructed to increase his p o  hydration at home, and while at work  Patient was given strict return precautions with which he agreed to comply  He was discharged in stable condition and felt safe going home  Disposition  Final diagnoses:   None     ED Disposition     None      Follow-up Information    None         Patient's Medications    No medications on file       No discharge procedures on file      PDMP Review     None          ED Provider  Electronically Signed by           Ciro Leach MD  09/04/22 2388

## 2022-09-05 ENCOUNTER — TELEPHONE (OUTPATIENT)
Dept: OTHER | Facility: OTHER | Age: 27
End: 2022-09-05

## 2022-09-06 NOTE — TELEPHONE ENCOUNTER
Patient post ER visit for intermittent dysuria  Occasionally feels pain while urinating or while masturbating  Has been tested multiple times for STDs which have been negative  UA unremarkable for infection or blood

## 2022-09-07 ENCOUNTER — HOSPITAL ENCOUNTER (EMERGENCY)
Facility: HOSPITAL | Age: 27
Discharge: HOME | End: 2022-09-07
Attending: EMERGENCY MEDICINE
Payer: COMMERCIAL

## 2022-09-07 VITALS
TEMPERATURE: 97.8 F | SYSTOLIC BLOOD PRESSURE: 171 MMHG | HEART RATE: 78 BPM | DIASTOLIC BLOOD PRESSURE: 103 MMHG | OXYGEN SATURATION: 100 % | RESPIRATION RATE: 18 BRPM

## 2022-09-07 DIAGNOSIS — N39.0 UTI (URINARY TRACT INFECTION): ICD-10-CM

## 2022-09-07 DIAGNOSIS — N48.89 PENILE PAIN: Primary | ICD-10-CM

## 2022-09-07 DIAGNOSIS — I10 ASYMPTOMATIC HYPERTENSION: ICD-10-CM

## 2022-09-07 LAB
BACTERIA UR QL AUTO: ABNORMAL /HPF
BILIRUB UR QL STRIP: ABNORMAL
CLARITY UR: CLEAR
COLOR UR: ABNORMAL
GLUCOSE SERPL-MCNC: 80 MG/DL (ref 65–140)
GLUCOSE UR STRIP-MCNC: NEGATIVE MG/DL
HGB UR QL STRIP.AUTO: NEGATIVE
HYALINE CASTS #/AREA URNS LPF: ABNORMAL /LPF
KETONES UR STRIP-MCNC: ABNORMAL MG/DL
LEUKOCYTE ESTERASE UR QL STRIP: NEGATIVE
MUCOUS THREADS UR QL AUTO: ABNORMAL
NITRITE UR QL STRIP: POSITIVE
NON-SQ EPI CELLS URNS QL MICRO: ABNORMAL /HPF
PH UR STRIP.AUTO: 6.5 [PH]
PROT UR STRIP-MCNC: ABNORMAL MG/DL
RBC #/AREA URNS AUTO: ABNORMAL /HPF
SP GR UR STRIP.AUTO: 1.03 (ref 1–1.03)
UROBILINOGEN UR STRIP-ACNC: 6 MG/DL
WBC #/AREA URNS AUTO: ABNORMAL /HPF

## 2022-09-07 PROCEDURE — 82948 REAGENT STRIP/BLOOD GLUCOSE: CPT

## 2022-09-07 PROCEDURE — 99284 EMERGENCY DEPT VISIT MOD MDM: CPT | Performed by: EMERGENCY MEDICINE

## 2022-09-07 PROCEDURE — 87591 N.GONORRHOEAE DNA AMP PROB: CPT

## 2022-09-07 PROCEDURE — 87491 CHLMYD TRACH DNA AMP PROBE: CPT

## 2022-09-07 PROCEDURE — 99283 EMERGENCY DEPT VISIT LOW MDM: CPT

## 2022-09-07 PROCEDURE — 81001 URINALYSIS AUTO W/SCOPE: CPT

## 2022-09-07 RX ORDER — CEPHALEXIN 250 MG/1
500 CAPSULE ORAL ONCE
Status: COMPLETED | OUTPATIENT
Start: 2022-09-07 | End: 2022-09-07

## 2022-09-07 RX ORDER — CEPHALEXIN 500 MG/1
500 CAPSULE ORAL EVERY 6 HOURS SCHEDULED
Qty: 31 CAPSULE | Refills: 0 | Status: SHIPPED | OUTPATIENT
Start: 2022-09-07 | End: 2022-09-15

## 2022-09-07 RX ADMIN — CEPHALEXIN 500 MG: 250 CAPSULE ORAL at 22:33

## 2022-09-08 LAB
C TRACH DNA SPEC QL NAA+PROBE: NEGATIVE
N GONORRHOEA DNA SPEC QL NAA+PROBE: NEGATIVE

## 2022-09-08 NOTE — ED PROVIDER NOTES
History  Chief Complaint   Patient presents with    Medical Problem     Pt arrives from work w/ c/o of ongoing burning in urethra  Was told to follow up with specialist and was told he couldn't be seen until October  Pt states he is unable to get through a normal day due to burning  Pt reports upon entering waiting room panic attack symptoms  Pt noticeably anxious during triage  C/o bilateral hand tingling  Pt instructed on breathing and reports improvement in anxiety  71-year-old male no pertinent past medical history presenting with dysuria  Patient has been seen multiple times with past month for this complaint  Patient states that about month ago he noticed a white discoloration the small point the area between the shaft and head of his penis, went to Urgent Care, prescribed Rocephin and doxycycline for presumed GC/chlamydia however he tested negative later  Despite antibiotic therapy, pain with urination continued  Having pain at the very tip of the urethral meatus with urination and ejaculation  Has been seen multiple times in the past week with multiple negative UA and negative GC/chlamydia  Denies fevers, chills, discharge, ulcers  Around the time of when this pain started he switched from an organic bar soap to a liquid soap  On questioning, possible that he has been getting the soap in his urethra  Has had 3 sexual partners in the past 6 months but none over the past 3-4 weeks  None       Past Medical History:   Diagnosis Date    Irregular heart beat     Viral warts     Resolved 8/6/2014        History reviewed  No pertinent surgical history  Family History   Problem Relation Age of Onset    Hypertension Father     Heart disease Father     Hyperlipidemia Father         Pure hypercholesterolemia     Colon cancer Maternal Grandfather      I have reviewed and agree with the history as documented      E-Cigarette/Vaping    E-Cigarette Use Current Every Day User E-Cigarette/Vaping Substances    Nicotine Yes      Social History     Tobacco Use    Smoking status: Former Smoker    Smokeless tobacco: Former User    Tobacco comment: vaping   Vaping Use    Vaping Use: Every day    Substances: Nicotine   Substance Use Topics    Alcohol use: Yes     Comment: occational    Drug use: Yes     Types: Marijuana        Review of Systems   Constitutional: Negative for activity change, fever and unexpected weight change  HENT: Negative for postnasal drip and rhinorrhea  Eyes: Negative for visual disturbance  Respiratory: Negative for cough, chest tightness and shortness of breath  Cardiovascular: Negative for chest pain and palpitations  Gastrointestinal: Negative for abdominal pain, blood in stool, constipation, diarrhea, nausea and vomiting  Genitourinary: Positive for dysuria and penile pain  Negative for decreased urine volume, difficulty urinating, enuresis, flank pain, frequency, genital sores, hematuria, penile discharge, penile swelling, scrotal swelling, testicular pain and urgency  Musculoskeletal: Positive for back pain  Skin: Negative for color change and wound  Allergic/Immunologic: Negative for immunocompromised state  Neurological: Negative for dizziness and syncope  Hematological: Negative for adenopathy  Does not bruise/bleed easily  Psychiatric/Behavioral: Negative for dysphoric mood  The patient is not nervous/anxious  All other systems reviewed and are negative        Physical Exam  ED Triage Vitals   Temperature Pulse Respirations Blood Pressure SpO2   09/07/22 2006 09/07/22 2006 09/07/22 2006 09/07/22 2008 09/07/22 2006   97 8 °F (36 6 °C) 78 18 (!) 171/103 100 %      Temp Source Heart Rate Source Patient Position - Orthostatic VS BP Location FiO2 (%)   09/07/22 2006 09/07/22 2006 09/07/22 2006 09/07/22 2006 --   Oral Monitor Sitting Right arm       Pain Score       --                    Orthostatic Vital Signs  Vitals: 09/07/22 2006 09/07/22 2008   BP:  (!) 171/103   Pulse: 78    Patient Position - Orthostatic VS: Sitting        Physical Exam  Vitals and nursing note reviewed  Constitutional:       General: He is not in acute distress  Appearance: Normal appearance  He is normal weight  He is not ill-appearing, toxic-appearing or diaphoretic  HENT:      Head: Normocephalic and atraumatic  Right Ear: External ear normal       Left Ear: External ear normal       Nose: Nose normal    Eyes:      General: No scleral icterus  Right eye: No discharge  Left eye: No discharge  Pupils: Pupils are equal, round, and reactive to light  Cardiovascular:      Rate and Rhythm: Normal rate  Pulses: Normal pulses  Pulmonary:      Effort: Pulmonary effort is normal  No respiratory distress  Breath sounds: No stridor  Abdominal:      General: Abdomen is flat  There is no distension  Hernia: No hernia is present  There is no hernia in the left inguinal area or right inguinal area  Genitourinary:     Penis: Normal and circumcised  No phimosis, paraphimosis, hypospadias, erythema, tenderness, discharge, swelling or lesions  Testes: Normal       Jeffrey stage (genital): 5  Musculoskeletal:         General: Normal range of motion  Cervical back: Normal range of motion  Lymphadenopathy:      Lower Body: No right inguinal adenopathy  No left inguinal adenopathy  Skin:     General: Skin is warm and dry  Coloration: Skin is not jaundiced  Neurological:      General: No focal deficit present  Mental Status: He is alert  Mental status is at baseline     Psychiatric:         Mood and Affect: Mood normal          Behavior: Behavior normal          ED Medications  Medications   cephalexin (KEFLEX) capsule 500 mg (500 mg Oral Given 9/7/22 2233)       Diagnostic Studies  Results Reviewed     Procedure Component Value Units Date/Time    Fingerstick Glucose (POCT) [[id number]]  (Normal) Collected: 09/07/22 2230    Lab Status: Final result Updated: 09/07/22 2231     POC Glucose 80 mg/dl     Urine Microscopic [[id number]]  (Abnormal) Collected: 09/07/22 2134    Lab Status: Final result Specimen: Urine, Clean Catch Updated: 09/07/22 2214     RBC, UA None Seen /hpf      WBC, UA 2-4 /hpf      Epithelial Cells None Seen /hpf      Bacteria, UA None Seen /hpf      MUCUS THREADS Occasional     Hyaline Casts, UA 0-3 /lpf     UA w Reflex to Microscopic w Reflex to Culture [[id number]]  (Abnormal) Collected: 09/07/22 2134    Lab Status: Final result Specimen: Urine, Clean Catch Updated: 09/07/22 2202     Color, UA Dark Yellow     Clarity, UA Clear     Specific Gravity, UA 1 026     pH, UA 6 5     Leukocytes, UA Negative     Nitrite, UA Positive     Protein,  (2+) mg/dl      Glucose, UA Negative mg/dl      Ketones, UA 40 (2+) mg/dl      Urobilinogen, UA 6 0 mg/dl      Bilirubin, UA Small     Occult Blood, UA Negative    Chlamydia/GC amplified DNA by PCR [[id number]] Collected: 09/07/22 2135    Lab Status: In process Specimen: Urine, Other Updated: 09/07/22 2139                 No orders to display         Procedures  Procedures      ED Course                                       MDM  Number of Diagnoses or Management Options  Asymptomatic hypertension: new and requires workup  Penile pain: established and worsening  UTI (urinary tract infection): new and requires workup  Diagnosis management comments: Patient still having burning pain at the very tip of the urethral meatus when he urinates  Based on all of the negative evaluations he has had, strongly believe that he is having a contact irritation from the soap that has been getting into his urethra which would explain patient's presentation  At his request, did testing for GC chlamydia and UTI which did end up having nitrites  Will treat him for UTI however still believe that his primary issue is irritation from soap      Incidentally found him to have high blood pressure here  Discussed with patient and recommended that he follow-up with PCP      Disposition  Final diagnoses:   Penile pain   Asymptomatic hypertension   UTI (urinary tract infection)     Time reflects when diagnosis was documented in both MDM as applicable and the Disposition within this note     Time User Action Codes Description Comment    9/7/2022  9:33 PM Willim Earing Add [N34 1] Urethritis, nonspecific     9/7/2022  9:59 PM Willim Earing Remove [N34 1] Urethritis, nonspecific     9/7/2022 10:00 PM Willim Earing Add [N48 89] Penile pain     9/7/2022 10:00 PM Willim Earing Add [I10] Asymptomatic hypertension     9/7/2022 10:06 PM Willim Earing Add [N39 0] UTI (urinary tract infection)       ED Disposition     ED Disposition   Discharge    Condition   Stable    Date/Time   Wed Sep 7, 2022  9:33 PM    Comment   Atamaria 56 discharge to home/self care  Follow-up Information     Follow up With Specialties Details Why Contact Info Additional [address] Emergency Department Emergency Medicine Go to  As needed, If symptoms worsen [address] [facility] [id number] [place] [address] Emergency Department, [address], [place], [place], [address]    Urology   Keep your appointment with your urologist      [place] Schedule an appointment as soon as possible for a visit  Your blood pressure was elevated in the ED and you should follow up with a primary care physician   If you do not have one, you can call [facility]  to establish care [address] [phone]  [id number]  , [place], [place], [address]          Patient's Medications   Discharge Prescriptions    CEPHALEXIN (KEFLEX) 500 MG CAPSULE    Take 1 capsule (500 mg total) by mouth every 6 (six) hours for 8 days       Start Date: 9/7/2022  End Date: 9/15/2022       Order Dose: 500 mg       Quantity: 31 capsule    Refills: 0     No discharge procedures on file  PDMP Review     None           ED Provider  Attending physically available and evaluated Khris Tootie ESPINOSA managed the patient along with the ED Attending      Electronically Signed by         Priya Reilly MD  09/07/22 0360       Priya Reilly MD  09/07/22 4690

## 2022-09-08 NOTE — ED ATTENDING ATTESTATION
9/7/2022  IDanilo MD, saw and evaluated the patient  I have discussed the patient with the resident/non-physician practitioner and agree with the resident's/non-physician practitioner's findings, Plan of Care, and MDM as documented in the resident's/non-physician practitioner's note, except where noted  All available labs and Radiology studies were reviewed  I was present for key portions of any procedure(s) performed by the resident/non-physician practitioner and I was immediately available to provide assistance  At this point I agree with the current assessment done in the Emergency Department  I have conducted an independent evaluation of this patient a history and physical is as follows:    34 y/o presenting with penile tip burning  Over a month of symptoms  Multiple negative workups  No f/c  No n/v  No abd pain  No cva pain  Testicle normal exam  Circumcised  No rash or erythema at tip of penis  No erythema at meatus  Already was follow up with urology      Agree with ua and gc/chalmydia test, outpatient follow up    ED Course         Critical Care Time  Procedures

## 2022-09-08 NOTE — DISCHARGE INSTRUCTIONS
Switch to a hard soap, make sure no soap gets into the urethra  Make sure you are drinking enough fluids

## 2022-09-13 ENCOUNTER — OFFICE VISIT (OUTPATIENT)
Dept: FAMILY MEDICINE CLINIC | Facility: OTHER | Age: 27
End: 2022-09-13
Payer: COMMERCIAL

## 2022-09-13 VITALS
BODY MASS INDEX: 20.72 KG/M2 | TEMPERATURE: 97.8 F | RESPIRATION RATE: 18 BRPM | OXYGEN SATURATION: 98 % | HEIGHT: 72 IN | SYSTOLIC BLOOD PRESSURE: 122 MMHG | WEIGHT: 153 LBS | DIASTOLIC BLOOD PRESSURE: 88 MMHG | HEART RATE: 59 BPM

## 2022-09-13 DIAGNOSIS — R30.0 DYSURIA: Primary | ICD-10-CM

## 2022-09-13 DIAGNOSIS — F41.9 ANXIETY: ICD-10-CM

## 2022-09-13 DIAGNOSIS — N34.2 URETHRITIS: ICD-10-CM

## 2022-09-13 PROCEDURE — 99203 OFFICE O/P NEW LOW 30 MIN: CPT

## 2022-09-13 PROCEDURE — 3725F SCREEN DEPRESSION PERFORMED: CPT

## 2022-09-13 RX ORDER — PHENAZOPYRIDINE HYDROCHLORIDE 200 MG/1
200 TABLET, FILM COATED ORAL
Qty: 10 TABLET | Refills: 0 | Status: SHIPPED | OUTPATIENT
Start: 2022-09-13

## 2022-09-13 RX ORDER — HYDROXYZINE HYDROCHLORIDE 25 MG/1
25 TABLET, FILM COATED ORAL EVERY 6 HOURS PRN
Qty: 30 TABLET | Refills: 1 | Status: SHIPPED | OUTPATIENT
Start: 2022-09-13

## 2022-09-13 NOTE — PROGRESS NOTES
Assessment/Plan:  27yoM currently being empirically treated for UTI with Keflex presents for >1 month dysuria and urethritis despite completing empiric treatment for G/C in setting of negative G/C PCR x2  DDx nongonoccocal urethritis vs UTI vs residual urethritis from empirically treated G/C  Urology appt 10/13  Continue Keflex, ibuprofen  Rx pyridium  Screen for HIV/Hep C  Test for trichomonas and M  Genitalium   Reassurance + atarax PRN for new onset anxiety due to  sx      No problem-specific Assessment & Plan notes found for this encounter  Diagnoses and all orders for this visit:    Dysuria  -     Trichomonas vaginalis/Mycoplasma genitalium PCR; Future  -     HIV 1/2 Antigen/Antibody (4th Generation) w Reflex [facility]; Future  -     Hepatitis C antibody; Future  -     phenazopyridine (PYRIDIUM) 200 mg tablet; Take 1 tablet (200 mg total) by mouth 3 (three) times a day with meals    Urethritis  -     Trichomonas vaginalis/Mycoplasma genitalium PCR; Future  -     HIV 1/2 Antigen/Antibody (4th Generation) w Reflex [facility]; Future  -     Hepatitis C antibody; Future  -     phenazopyridine (PYRIDIUM) 200 mg tablet; Take 1 tablet (200 mg total) by mouth 3 (three) times a day with meals    Anxiety  -     hydrOXYzine HCL (ATARAX) 25 mg tablet; Take 1 tablet (25 mg total) by mouth every 6 (six) hours as needed for anxiety          Subjective:      Patient ID: Marnie Choi is a 33 y o  male  HPI   Unresolving dysuria and urethritis since August 9th 2022  Has went to urgent and ED multiple times  Tested negative twice for G/C  3 U/A - twice negative and most recently +nitrites  Finished course of doxycycline and rocephin  Most recently prescribed Keflex to empirically treat UTI 9/7  Continues to have  symptoms  Constant 1-2/10 pain that is worse (5-6/10) with urination and especially in the morning  Has recently taken advil (~600 mg BID) with moderate improvement in symptoms   Endorses some urinary retention, but admits he thinks its because he is hesistant to urinate due to the pain and has also been drinking less because of this as well  Denies nocturia, difficulty starting a stream or incontinence  Maintains that he has never had penile discharge during the beginning of his symptoms  Has changed soaps without much change in symptoms  Sexual history: Prior to August 9th, was sexually active with 2 female partners  They do not have any STD symptoms to his knowledge  Has not been sexually active since his  symptoms started  Tells me he was diagnosed and treated for gonorrhea approximately 6 months ago - admits to having a one-night stand around this time but not in contact with this partner anymore  Has masturbated twice since August 9th, pain with ejaculation  Does not use anything when masturbating  Social history: 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  Developing anxiety due to  symptoms and possibility of being terminated due to not having any more sick days/days for medical appointments  Arrives with [facility] paperwork  Anxiety is new, never has had problems with anxiety in the past  Anxiety due to continuing  symptoms  Worse in the morning, has it everyday  Asx hypertension: Recommended follow-up from last ED visit  Patient likely had elevated BP due to panic attack on presentation  Today is 122/88  Will continue to monitor  The following portions of the patient's history were reviewed and updated as appropriate: allergies, current medications, past family history, past medical history, past social history, past surgical history and problem list     Review of Systems   Constitutional: Negative for activity change, appetite change, chills and fever  HENT: Negative for ear pain  Eyes: Negative for pain and visual disturbance  Respiratory: Negative for cough  Cardiovascular: Negative for chest pain     Gastrointestinal: Negative for abdominal pain, constipation, diarrhea, nausea and vomiting  Endocrine: Negative for polyuria  Genitourinary: Positive for dysuria  Negative for difficulty urinating, enuresis, flank pain, frequency, genital sores, hematuria, penile discharge, penile pain, penile swelling, scrotal swelling, testicular pain and urgency  Musculoskeletal: Negative for myalgias  Skin: Negative for rash and wound  Neurological: Negative for light-headedness and headaches  Psychiatric/Behavioral: The patient is nervous/anxious  All other systems reviewed and are negative  Objective:      /88   Pulse 59   Temp 97 8 °F (36 6 °C)   Resp 18   Ht 6' (1 829 m)   Wt 69 4 kg (153 lb)   SpO2 98%   BMI 20 75 kg/m²          Physical Exam  Vitals and nursing note reviewed  Constitutional:       Appearance: He is normal weight  HENT:      Head: Normocephalic and atraumatic  Right Ear: External ear normal       Left Ear: External ear normal    Cardiovascular:      Rate and Rhythm: Normal rate and regular rhythm  Pulses: Normal pulses  Heart sounds: Normal heart sounds  Pulmonary:      Effort: Pulmonary effort is normal       Breath sounds: Normal breath sounds  Genitourinary:     Penis: Normal        Testes: Normal       Comments: No sores  No discharge  No erythema  Non-tender to palpation  Musculoskeletal:         General: Normal range of motion  Skin:     General: Skin is warm and dry  Capillary Refill: Capillary refill takes less than 2 seconds  Neurological:      General: No focal deficit present  Mental Status: He is alert and oriented to person, place, and time     Psychiatric:         Mood and Affect: Mood normal          Behavior: Behavior normal            Vicky Askew MD  9/15/2022

## 2022-09-13 NOTE — PATIENT INSTRUCTIONS
Continue drinking plenty of fluids  Nonspecific Urethritis in Men   WHAT YOU NEED TO KNOW:   What is nonspecific urethritis? Nonspecific urethritis is a condition that causes inflammation of the urethra  The urethra is the tube where urine passes from the bladder to the outside of the body  Men who have sex with men and those with multiple sexual partners are at a high risk of having this condition  What causes nonspecific urethritis? Sexually transmitted infections (STIs), such as chlamydia, mycoplasma, or trichomonas    Bath soap, spermicides, or vaginal chemicals from your sexual partner    Trauma from objects or accidents    Medical conditions, such as Gwynneth Neigh Syndrome    What are the signs and symptoms of nonspecific urethritis? Feeling like you need to urinate more often than usual    Fever    Abdominal pain    Pain or a burning feeling when you urinate    Pain or itchiness in your penis    Pain when you have sex    Thin and slightly cloudy, or thick yellow-green discharge from your penis    How is nonspecific urethritis diagnosed? Your healthcare provider will ask you about medical conditions you have had  He may ask questions about your sexual partners and practices  If your healthcare provider sees from your sexual history that you are at risk, you may need to be screened for infection from sexually transmitted infections STIs, such as chlamydia  You may need any of the following tests:  Blood tests: These may be done to check for STIs  Urine tests:  Urine may be checked to see if white blood cells or germs are in it  Urethral fluid tests:  A sample of fluid is collected from inside the urethra  This fluid is then checked for white blood cells and germs  How is nonspecific urethritis treated? Antibiotic medicines may be given to help treat an infection caused by bacteria  Both you and your sexual partner must be treated to prevent an infection from spreading   If you have other sexual partners within the past 60 days, they should be tested and treated  Nonspecific urethritis caused by irritation or injury may be treated with antibiotic medicine  How can I manage my symptoms? Heat:  Sit in a warm bath for 15 minutes at least 2 times a day  Do not use chemical irritants: This includes bath soaps, spermicides, or other products that may cause irritation  How can nonspecific urethritis be prevented? If your urethritis was caused by an infection, the following may help to prevent the spread:  Use condoms:  Wear a condom during oral, vaginal, and anal sex  Ask for more information about the correct way to use condoms  Do not have sex with someone who has urethritis: This includes oral, vaginal, and anal sex  Do not have sex during treatment:  Do not have sex while you or your partners are being treated  Ask when it is safe to have sex  Report pregnancy:  Tell your healthcare provider if your female partner is pregnant  You may have spread an infection to her, and she may pass it to her infant during birth  CARE AGREEMENT:   You have the right to help plan your care  Learn about your health condition and how it may be treated  Discuss treatment options with your healthcare providers to decide what care you want to receive  You always have the right to refuse treatment  The above information is an  only  It is not intended as medical advice for individual conditions or treatments  Talk to your doctor, nurse or pharmacist before following any medical regimen to see if it is safe and effective for you  © Copyright [employer] 2022 Information is for End User's use only and may not be sold, redistributed or otherwise used for commercial purposes   All illustrations and images included in CareNotes® are the copyrighted property of [employer]  or [facility] [employer]

## 2022-09-14 ENCOUNTER — LAB (OUTPATIENT)
Dept: LAB | Facility: CLINIC | Age: 27
End: 2022-09-14
Payer: COMMERCIAL

## 2022-09-14 DIAGNOSIS — N34.2 URETHRITIS: ICD-10-CM

## 2022-09-14 DIAGNOSIS — R30.0 DYSURIA: ICD-10-CM

## 2022-09-14 LAB — HCV AB SER QL: NORMAL

## 2022-09-14 PROCEDURE — 87661 TRICHOMONAS VAGINALIS AMPLIF: CPT

## 2022-09-14 PROCEDURE — 87389 HIV-1 AG W/HIV-1&-2 AB AG IA: CPT

## 2022-09-14 PROCEDURE — 36415 COLL VENOUS BLD VENIPUNCTURE: CPT

## 2022-09-14 PROCEDURE — 86803 HEPATITIS C AB TEST: CPT

## 2022-09-14 PROCEDURE — 87563 M. GENITALIUM AMP PROBE: CPT

## 2022-09-15 LAB
HIV 1+2 AB+HIV1 P24 AG SERPL QL IA: NORMAL
M GENITALIUM DNA SPEC QL NAA+PROBE: NEGATIVE
T VAGINALIS DNA SPEC QL NAA+PROBE: NEGATIVE

## 2022-10-13 ENCOUNTER — TELEPHONE (OUTPATIENT)
Dept: UROLOGY | Facility: AMBULATORY SURGERY CENTER | Age: 27
End: 2022-10-13

## 2022-10-13 ENCOUNTER — OFFICE VISIT (OUTPATIENT)
Dept: UROLOGY | Facility: CLINIC | Age: 27
End: 2022-10-13
Payer: COMMERCIAL

## 2022-10-13 VITALS
DIASTOLIC BLOOD PRESSURE: 68 MMHG | HEIGHT: 72 IN | HEART RATE: 72 BPM | OXYGEN SATURATION: 99 % | BODY MASS INDEX: 20.05 KG/M2 | WEIGHT: 148 LBS | SYSTOLIC BLOOD PRESSURE: 108 MMHG

## 2022-10-13 DIAGNOSIS — R30.0 DYSURIA: Primary | ICD-10-CM

## 2022-10-13 LAB
BACTERIA UR QL AUTO: NORMAL /HPF
BILIRUB UR QL STRIP: NEGATIVE
CLARITY UR: CLEAR
COLOR UR: NORMAL
GLUCOSE UR STRIP-MCNC: NEGATIVE MG/DL
HGB UR QL STRIP.AUTO: NEGATIVE
KETONES UR STRIP-MCNC: NEGATIVE MG/DL
LEUKOCYTE ESTERASE UR QL STRIP: NEGATIVE
NITRITE UR QL STRIP: NEGATIVE
NON-SQ EPI CELLS URNS QL MICRO: NORMAL /HPF
PH UR STRIP.AUTO: 6 [PH]
POST-VOID RESIDUAL VOLUME, ML POC: 11 ML
PROT UR STRIP-MCNC: NEGATIVE MG/DL
RBC #/AREA URNS AUTO: NORMAL /HPF
SL AMB  POCT GLUCOSE, UA: NORMAL
SL AMB LEUKOCYTE ESTERASE,UA: NORMAL
SL AMB POCT BILIRUBIN,UA: NORMAL
SL AMB POCT BLOOD,UA: NORMAL
SL AMB POCT CLARITY,UA: CLEAR
SL AMB POCT COLOR,UA: YELLOW
SL AMB POCT KETONES,UA: NORMAL
SL AMB POCT NITRITE,UA: NORMAL
SL AMB POCT PH,UA: 6
SL AMB POCT SPECIFIC GRAVITY,UA: 1.03
SL AMB POCT URINE PROTEIN: NORMAL
SL AMB POCT UROBILINOGEN: 0.2
SP GR UR STRIP.AUTO: 1.02 (ref 1–1.03)
UROBILINOGEN UR STRIP-ACNC: <2 MG/DL
WBC #/AREA URNS AUTO: NORMAL /HPF

## 2022-10-13 PROCEDURE — 81001 URINALYSIS AUTO W/SCOPE: CPT | Performed by: PHYSICIAN ASSISTANT

## 2022-10-13 PROCEDURE — 87086 URINE CULTURE/COLONY COUNT: CPT | Performed by: PHYSICIAN ASSISTANT

## 2022-10-13 PROCEDURE — 51798 US URINE CAPACITY MEASURE: CPT | Performed by: PHYSICIAN ASSISTANT

## 2022-10-13 PROCEDURE — 81002 URINALYSIS NONAUTO W/O SCOPE: CPT | Performed by: PHYSICIAN ASSISTANT

## 2022-10-13 PROCEDURE — 99203 OFFICE O/P NEW LOW 30 MIN: CPT | Performed by: PHYSICIAN ASSISTANT

## 2022-10-13 NOTE — TELEPHONE ENCOUNTER
Patient was seen today by Linsey Matthews  Pt wants to go ahead with the next step that he talked to [place] about  Please review and advice       Pt can be reached at [phone]

## 2022-10-13 NOTE — TELEPHONE ENCOUNTER
Patient just seen today by Denise Paez, and plan states:    1   Dysuria/urethritis   - symptoms have primarily resolved at this point after course of keflex  - normal genital and prostate examination  - conservative measures reviewed  - if reoccurence, repeat urine/STD testing and return for cystoscopy for evaluation of urethral stricture disease given + gonorrhea earlier in the year    Please place appropriate orders and will call patient with info and to schedule for cystoscopy

## 2022-10-13 NOTE — PROGRESS NOTES
1  Dysuria  POCT urine dip    POCT Measure PVR    Ambulatory Referral to Urology    Urinalysis with microscopic    Urine culture         Assessment and plan:       1  Dysuria/urethritis   - symptoms have primarily resolved at this point after course of keflex  - normal genital and prostate examination  - conservative measures reviewed  - if reoccurence, repeat urine/STD testing and return for cystoscopy for evaluation of urethral stricture disease given + gonorrhea earlier in the year       Thera Antonio      Chief Complaint     dysuria    History of Present Illness     Da Costa is a 33 y o  Male presenting for consutlation  Patient has been having ongoing dysuria prompting multiple ER and urgent care visits  Denies penile discharge  Previously diagnosed and treated for gonorrhea 6 months ago  Some dysorgasmia reported  Reported anxiety and started on atarax recently  Tested negative for g/c, tichomonas, HIV, hepatitis  Finished course of doxycycline and rocephin, more recently a course of keflex  Patient feels his symptoms have resolved with his recent course of keflex  Urine dip leukocyte, nitrite, blood negative  PVR 11mL    Laboratory     Lab Results   Component Value Date    CREATININE 1 08 12/28/2020       Review of Systems     Review of Systems   Constitutional: Negative for activity change, appetite change, chills, diaphoresis, fatigue, fever and unexpected weight change  Respiratory: Negative for chest tightness and shortness of breath  Cardiovascular: Negative for chest pain, palpitations and leg swelling  Gastrointestinal: Negative for abdominal distention, abdominal pain, constipation, diarrhea, nausea and vomiting  Genitourinary: Negative for decreased urine volume, difficulty urinating, dysuria, enuresis, flank pain, frequency, genital sores, hematuria and urgency  Musculoskeletal: Negative for back pain, gait problem and myalgias     Skin: Negative for color change, pallor, rash and wound  Psychiatric/Behavioral: Negative for behavioral problems  The patient is not nervous/anxious  AUA SYMPTOM SCORE    Flowsheet Row Most Recent Value   AUA SYMPTOM SCORE    How often have you had a sensation of not emptying your bladder completely after you finished urinating? 1 (P)     How often have you had to urinate again less than two hours after you finished urinating? 2 (P)     How often have you found you stopped and started again several times when you urinate? 1 (P)     How often have you found it difficult to postpone urination? 0 (P)     How often have you had a weak urinary stream? 0 (P)     How often have you had to push or strain to begin urination? 1 (P)     How many times did you most typically get up to urinate from the time you went to bed at night until the time you got up in the morning? 0 (P)     Quality of Life: If you were to spend the rest of your life with your urinary condition just the way it is now, how would you feel about that? 4 (P)     AUA SYMPTOM SCORE 5 (P)           Allergies     No Known Allergies    Physical Exam     Physical Exam  Constitutional:       General: He is not in acute distress  Appearance: Normal appearance  He is normal weight  He is not ill-appearing, toxic-appearing or diaphoretic  HENT:      Head: Normocephalic and atraumatic  Eyes:      General:         Right eye: No discharge  Left eye: No discharge  Conjunctiva/sclera: Conjunctivae normal    Pulmonary:      Effort: Pulmonary effort is normal  No respiratory distress  Genitourinary:     Penis: Circumcised  No phimosis, paraphimosis, hypospadias or erythema  Testes:         Right: Mass or tenderness not present  Left: Mass or tenderness not present  Epididymis:      Right: Normal  Not inflamed  Left: Normal  Not inflamed  Comments: Good rectal tone   Prostate 15, smooth, nonboggy, nontender, no nodules  Musculoskeletal:         General: No swelling or tenderness  Normal range of motion  Skin:     General: Skin is warm and dry  Coloration: Skin is not jaundiced or pale  Neurological:      General: No focal deficit present  Mental Status: He is alert and oriented to person, place, and time  Psychiatric:         Mood and Affect: Mood normal          Behavior: Behavior normal          Thought Content: Thought content normal            Vital Signs     Vitals:    10/13/22 0744   BP: 108/68   Pulse: 72   SpO2: 99%   Weight: 67 1 kg (148 lb)   Height: 6' (1 829 m)         Current Medications       Current Outpatient Medications:   •  hydrOXYzine HCL (ATARAX) 25 mg tablet, Take 1 tablet (25 mg total) by mouth every 6 (six) hours as needed for anxiety, Disp: 30 tablet, Rfl: 1  •  phenazopyridine (PYRIDIUM) 200 mg tablet, Take 1 tablet (200 mg total) by mouth 3 (three) times a day with meals (Patient not taking: Reported on 10/13/2022), Disp: 10 tablet, Rfl: 0      Active Problems     There is no problem list on file for this patient          Past Medical History     Past Medical History:   Diagnosis Date   • Irregular heart beat    • Penile pain    • Viral warts     Resolved 8/6/2014          Surgical History     Past Surgical History:   Procedure Laterality Date   • HEMORROIDECTOMY  02/2022         Family History     Family History   Problem Relation Age of Onset   • Hypertension Father    • Heart disease Father         Leaky valve   • Hyperlipidemia Father         Pure hypercholesterolemia    • Arthritis Mother    • Colon cancer Maternal Grandfather    • Seizures Sister    • Albinism Sister    • Vision loss Sister          Social History     Social History       Radiology

## 2022-10-14 DIAGNOSIS — R30.0 DYSURIA: Primary | ICD-10-CM

## 2022-10-14 LAB — BACTERIA UR CULT: NORMAL

## 2022-10-14 NOTE — TELEPHONE ENCOUNTER
Samara Dakin, [facility] Josue Rich Clinical 47 minutes ago (3:44 PM)     BL    STD labs ordered  Return for cysto thanks      Spoke with patient and informed him of message  He is aware STD orders in chart and will go to [facility] lab  He is scheduled for cysto on 11/23/22 @ [facility]

## 2022-11-04 ENCOUNTER — APPOINTMENT (OUTPATIENT)
Dept: LAB | Facility: CLINIC | Age: 27
End: 2022-11-04

## 2022-11-04 DIAGNOSIS — R30.0 DYSURIA: ICD-10-CM

## 2022-11-05 LAB
C TRACH DNA SPEC QL NAA+PROBE: NEGATIVE
N GONORRHOEA DNA SPEC QL NAA+PROBE: NEGATIVE
RPR SER QL: NORMAL

## 2022-11-06 LAB — HIV 1+2 AB+HIV1 P24 AG SERPL QL IA: NORMAL

## 2022-11-07 LAB
HSV1 IGG SER IA-ACNC: 17.4 INDEX (ref 0–0.9)
HSV2 IGG SER IA-ACNC: <0.91 INDEX (ref 0–0.9)

## 2022-12-27 ENCOUNTER — PROCEDURE VISIT (OUTPATIENT)
Dept: UROLOGY | Facility: CLINIC | Age: 27
End: 2022-12-27

## 2022-12-27 VITALS
DIASTOLIC BLOOD PRESSURE: 80 MMHG | HEIGHT: 72 IN | SYSTOLIC BLOOD PRESSURE: 120 MMHG | WEIGHT: 155 LBS | OXYGEN SATURATION: 99 % | BODY MASS INDEX: 20.99 KG/M2 | HEART RATE: 118 BPM

## 2022-12-27 DIAGNOSIS — R30.0 DYSURIA: Primary | ICD-10-CM

## 2022-12-27 NOTE — PATIENT INSTRUCTIONS
How to Increase your Healthy Fluid Intake - A Urologist's Advice    Carlos Gray MD,PhD  [facility]  [phone]    Increasing your daily dietary fluid intake can be a simple solution to many urologic problems  You have been advised by your Urology team to increase her fluid intake  Here are some helpful pieces of advice and guidance    How much should I drink each day? A general goal for most healthy individuals is to consume 64 ounces daily  This equates to 2 Liters, or 8 glasses daily  Highly active and performance-oriented individuals may need more water  Those who experience certain type of incontinence, or with heart or kidney insufficiency, should consume less  What should I drink? WATER is the absolute best fluid available  In a sense, it is the only healthy you can drink  How can I tell if I am drinking enough water to keep my body healthy? Your urine should be clear -- every time your urinate! When your pee is yellow, you need more  Please note that multivitamins and some prescription medication can discolor the urine  What to avoid:  - Sugar-containing drinks  This includes gatorades, and even juice!    - Artificial Sweeteners - is often marketed as a more healthy alternative to sugar containing drinks  However, these chemicals are not at all healthy  Many contain chemicals that can exacerbate overactive bladder, and impair kidney and natural hormonal function   - Sodas  These hurt your urologic health in many ways  The acids and sugars/artificial sweeteners make their way into your urine, and can cause bladder overactivity  They cause your kidneys to temporarily make MORE URINE (more trips to the bathroom)  After consumption, they then result in dehydration - making your urine more concentrated and painful to pass    - Energy drinks - these are the worst!  We have seen kidney stones, horribly painful urinary conditions, and kidney and heart conditions caused entirely by these  Aside from these extreme conditions, the cycle of energy spikes and crashes is truly awful  Stay away! What about caffeine? Try to stick to 1 cup of coffee in the morning if able  Healthy diet and exercise will provide better and longer lasting energy in the afternoons, and avoid the cycle of energy spikes and crashes (and the harmful chemicals) found in energy drinks  Be sure to drink some water after your morning coffee to prevent dehydration    Tips or Hacks to help with fluid intake:  - Purchase a refillable water bottle  Carry this throughout your day  This helps tracks your daily consumption, and minimize waste  - if you tend to dislike the taste or sensation of drinking plain water, try using a bottle with a straw  This can help make increasing your fluid intake way easier! - Put a slice of fresh fruit in your water bottle  Anything that is in the fridge - lemon, lime, melons, apples, orange slice (or peel!)  Get some delicious fresh taste without any artificial chemicals or processed carbohydrates!

## 2022-12-27 NOTE — PROGRESS NOTES
Cystoscopy     Date/Time 12/27/2022 9:53 AM     Performed by  Johnnie Toribio MD     Authorized by Johnnie Toribio MD          Procedure Details:  Procedure type: cystoscopy        Office Cystoscopy Procedure Note    Indication:     medically refractory lower urinary tract symptoms     Informed consent   The risks, benefits, complications, treatment options, and expected outcomes were discussed with the patient  The patient concurred with the proposed plan and provided informed consent  Anesthesia  Lidocaine jelly 2%    Procedure  The patient was placed in the supineposition, was prepped and draped in the usual manner using sterile technique, and 2% lidocaine jelly instilled into the urethra  A 17 F flexible cystoscope was then inserted into the urethra and the urethra and bladder carefully examined  The following findings were noted:    Findings:  Urethra:  Normal  Prostate:  Normal  Bladder:  Normal  Ureteral orifices:  Normal  Other findings:  None     Specimens: None                 Complications:    None; patient tolerated the procedure well           Disposition: To home after 30 minute observation  Condition: Stable    Plan:   Symptoms of nonspecific urethritis were likely related to dehydration  On careful questioning he is chronically dehydrated and takes minimal free water and drinks a lot of iced tea and diluted Powerade  Provided him with my counseling on increasing his fluid intake to a goal of 64 ounces or 2 L/day  Patient was reassured that his cystoscopy is normal, no evidence of urethral stricture disease or any obstructive uropathy  He will follow-up as needed

## 2023-05-08 ENCOUNTER — APPOINTMENT (EMERGENCY)
Dept: RADIOLOGY | Facility: HOSPITAL | Age: 28
End: 2023-05-08

## 2023-05-08 ENCOUNTER — HOSPITAL ENCOUNTER (EMERGENCY)
Facility: HOSPITAL | Age: 28
Discharge: HOME | End: 2023-05-08
Attending: INTERNAL MEDICINE

## 2023-05-08 VITALS
SYSTOLIC BLOOD PRESSURE: 141 MMHG | DIASTOLIC BLOOD PRESSURE: 91 MMHG | BODY MASS INDEX: 21.02 KG/M2 | OXYGEN SATURATION: 99 % | TEMPERATURE: 97.6 F | WEIGHT: 154.98 LBS | HEART RATE: 50 BPM | RESPIRATION RATE: 16 BRPM

## 2023-05-08 DIAGNOSIS — R07.9 CHEST PAIN, UNSPECIFIED TYPE: Primary | ICD-10-CM

## 2023-05-08 LAB
ALBUMIN SERPL BCP-MCNC: 4.4 G/DL (ref 3.5–5)
ALP SERPL-CCNC: 40 U/L (ref 34–104)
ALT SERPL W P-5'-P-CCNC: 59 U/L (ref 7–52)
ANION GAP SERPL CALCULATED.3IONS-SCNC: 10 MMOL/L (ref 4–13)
AST SERPL W P-5'-P-CCNC: 26 U/L (ref 13–39)
BASOPHILS # BLD AUTO: 0.02 THOUSANDS/ÂΜL (ref 0–0.1)
BASOPHILS NFR BLD AUTO: 0 % (ref 0–1)
BILIRUB SERPL-MCNC: 0.5 MG/DL (ref 0.2–1)
BUN SERPL-MCNC: 17 MG/DL (ref 5–25)
CALCIUM SERPL-MCNC: 9.6 MG/DL (ref 8.4–10.2)
CARDIAC TROPONIN I PNL SERPL HS: 3 NG/L
CHLORIDE SERPL-SCNC: 102 MMOL/L (ref 96–108)
CO2 SERPL-SCNC: 26 MMOL/L (ref 21–32)
CREAT SERPL-MCNC: 1.04 MG/DL (ref 0.6–1.3)
EOSINOPHIL # BLD AUTO: 0.12 THOUSAND/ÂΜL (ref 0–0.61)
EOSINOPHIL NFR BLD AUTO: 2 % (ref 0–6)
ERYTHROCYTE [DISTWIDTH] IN BLOOD BY AUTOMATED COUNT: 12.6 % (ref 11.6–15.1)
GFR SERPL CREATININE-BSD FRML MDRD: 97 ML/MIN/1.73SQ M
GLUCOSE SERPL-MCNC: 101 MG/DL (ref 65–140)
HCT VFR BLD AUTO: 45.3 % (ref 36.5–49.3)
HGB BLD-MCNC: 15.4 G/DL (ref 12–17)
IMM GRANULOCYTES # BLD AUTO: 0.01 THOUSAND/UL (ref 0–0.2)
IMM GRANULOCYTES NFR BLD AUTO: 0 % (ref 0–2)
LYMPHOCYTES # BLD AUTO: 1.85 THOUSANDS/ÂΜL (ref 0.6–4.47)
LYMPHOCYTES NFR BLD AUTO: 27 % (ref 14–44)
MCH RBC QN AUTO: 29.2 PG (ref 26.8–34.3)
MCHC RBC AUTO-ENTMCNC: 34 G/DL (ref 31.4–37.4)
MCV RBC AUTO: 86 FL (ref 82–98)
MONOCYTES # BLD AUTO: 0.64 THOUSAND/ÂΜL (ref 0.17–1.22)
MONOCYTES NFR BLD AUTO: 9 % (ref 4–12)
NEUTROPHILS # BLD AUTO: 4.14 THOUSANDS/ÂΜL (ref 1.85–7.62)
NEUTS SEG NFR BLD AUTO: 62 % (ref 43–75)
NRBC BLD AUTO-RTO: 0 /100 WBCS
PLATELET # BLD AUTO: 229 THOUSANDS/UL (ref 149–390)
PMV BLD AUTO: 11.3 FL (ref 8.9–12.7)
POTASSIUM SERPL-SCNC: 3.8 MMOL/L (ref 3.5–5.3)
PROT SERPL-MCNC: 7.2 G/DL (ref 6.4–8.4)
RBC # BLD AUTO: 5.27 MILLION/UL (ref 3.88–5.62)
SODIUM SERPL-SCNC: 138 MMOL/L (ref 135–147)
WBC # BLD AUTO: 6.78 THOUSAND/UL (ref 4.31–10.16)

## 2023-05-08 NOTE — Clinical Note
Jewell Page was seen and treated in our emergency department on 5/8/2023  No restrictions            Diagnosis:     Mellol Anay  may return to work on return date  He may return on this date: 05/10/2023         If you have any questions or concerns, please don't hesitate to call        Rubina Perea RN    ______________________________           _______________          _______________  Hospital Representative                              Date                                Time

## 2023-05-08 NOTE — DISCHARGE INSTRUCTIONS
Follow up with family medicine clinic  Take tylenol or motrin for pain if needed  Labs Reviewed   COMPREHENSIVE METABOLIC PANEL - Abnormal       Result Value Ref Range Status    Sodium 138  135 - 147 mmol/L Final    Potassium 3 8  3 5 - 5 3 mmol/L Final    Chloride 102  96 - 108 mmol/L Final    CO2 26  21 - 32 mmol/L Final    ANION GAP 10  4 - 13 mmol/L Final    BUN 17  5 - 25 mg/dL Final    Creatinine 1 04  0 60 - 1 30 mg/dL Final    Comment: Standardized to IDMS reference method    Glucose 101  65 - 140 mg/dL Final    Comment: If the patient is fasting, the ADA then defines impaired fasting glucose as > 100 mg/dL and diabetes as > or equal to 123 mg/dL  Calcium 9 6  8 4 - 10 2 mg/dL Final    AST 26  13 - 39 U/L Final    ALT 59 (*) 7 - 52 U/L Final    Comment: Specimen collection should occur prior to Sulfasalazine administration due to the potential for falsely depressed results  Alkaline Phosphatase 40  34 - 104 U/L Final    Total Protein 7 2  6 4 - 8 4 g/dL Final    Albumin 4 4  3 5 - 5 0 g/dL Final    Total Bilirubin 0 50  0 20 - 1 00 mg/dL Final    Comment: Use of this assay is not recommended for patients undergoing treatment with eltrombopag due to the potential for falsely elevated results  N-acetyl-p-benzoquinone imine (metabolite of Acetaminophen) will generate erroneously low results in samples for patients that have taken an overdose of Acetaminophen      eGFR 97  ml/min/1 73sq m Final    Narrative:     [place] guidelines for Chronic Kidney Disease (CKD):     Stage 1 with normal or high GFR (GFR > 90 mL/min/1 73 square meters)    Stage 2 Mild CKD (GFR = 60-89 mL/min/1 73 square meters)    Stage 3A Moderate CKD (GFR = 45-59 mL/min/1 73 square meters)    Stage 3B Moderate CKD (GFR = 30-44 mL/min/1 73 square meters)    Stage 4 Severe CKD (GFR = 15-29 mL/min/1 73 square meters)    Stage 5 End Stage CKD (GFR <15 mL/min/1 73 square meters)  Note: GFR calculation is accurate "only with a steady state creatinine   HS TROPONIN I 0HR - Normal    hs TnI 0hr 3  \"Refer to ACS Flowchart\"- see link ng/L Final    Comment:                                              Initial (time 0) result  If >=50 ng/L, Myocardial injury suggested ;  Type of myocardial injury and treatment strategy  to be determined  If 5-49 ng/L, a delta result at 2 hours and or 4 hours will be needed to further evaluate  If <4 ng/L, and chest pain has been >3 hours since onset, patient may qualify for discharge based on the HEART score in the ED  If <5 ng/L and <3hours since onset of chest pain, a delta result at 2 hours will be needed to further evaluate  HS Troponin 99th Percentile URL of a Health Population=12 ng/L with a 95% Confidence Interval of 8-18 ng/L  Second Troponin (time 2 hours)  If calculated delta >= 20 ng/L,  Myocardial injury suggested ; Type of myocardial injury and treatment strategy to be determined  If 5-49 ng/L and the calculated delta is 5-19 ng/L, consult medical service for evaluation  Continue evaluation for ischemia on ecg and other possible etiology and repeat hs troponin at 4 hours  If delta is <5 ng/L at 2 hours, consider discharge based on risk stratification via the HEART score (if in ED), or NINO risk score in IP/Observation      HS Troponin 99th Percentile URL of a Health Population=12 ng/L with a 95% Confidence Interval of 8-18 ng/L    CBC AND DIFFERENTIAL    WBC 6 78  4 31 - 10 16 Thousand/uL Final    RBC 5 27  3 88 - 5 62 Million/uL Final    Hemoglobin 15 4  12 0 - 17 0 g/dL Final    Hematocrit 45 3  36 5 - 49 3 % Final    MCV 86  82 - 98 fL Final    MCH 29 2  26 8 - 34 3 pg Final    MCHC 34 0  31 4 - 37 4 g/dL Final    RDW 12 6  11 6 - 15 1 % Final    MPV 11 3  8 9 - 12 7 fL Final    Platelets 490  926 - 390 Thousands/uL Final    nRBC 0  /100 WBCs Final    Neutrophils Relative 62  43 - 75 % Final    Immat GRANS % 0  0 - 2 % Final    Lymphocytes Relative 27  14 - 44 % Final    " Monocytes Relative 9  4 - 12 % Final    Eosinophils Relative 2  0 - 6 % Final    Basophils Relative 0  0 - 1 % Final    Neutrophils Absolute 4 14  1 85 - 7 62 Thousands/µL Final    Immature Grans Absolute 0 01  0 00 - 0 20 Thousand/uL Final    Lymphocytes Absolute 1 85  0 60 - 4 47 Thousands/µL Final    Monocytes Absolute 0 64  0 17 - 1 22 Thousand/µL Final    Eosinophils Absolute 0 12  0 00 - 0 61 Thousand/µL Final    Basophils Absolute 0 02  0 00 - 0 10 Thousands/µL Final   HS TROPONIN I 2HR   HS TROPONIN I 4HR     XR chest portable   Final Result      No acute cardiopulmonary disease        Findings are stable            Workstation performed: [id number]

## 2023-05-08 NOTE — ED PROVIDER NOTES
History  Chief Complaint   Patient presents with   • Chest Pain     Pt reports left sided chest pain when walking up steps today at work, prior to that started with a headache  This is a 33years old came for having chest pain  Patient stated that he was walking and all of a sudden he has left-sided chest pain and at one point in the chest   Patient denies SOB, nausea vomiting diaphoresis  Patient denies any cough  Patient has no history of CAD  Patient does not smoke but history of vaping  Go to gym and he does a lot of exercise  We will start patient is bradycardic but he denies any dizziness or headaches  The chest pain is 4/10, and is not radiating  Patient denies any abdominal pain  Prior to Admission Medications   Prescriptions Last Dose Informant Patient Reported? Taking?   hydrOXYzine HCL (ATARAX) 25 mg tablet 5/8/2023  No Yes   Sig: Take 1 tablet (25 mg total) by mouth every 6 (six) hours as needed for anxiety   phenazopyridine (PYRIDIUM) 200 mg tablet   No No   Sig: Take 1 tablet (200 mg total) by mouth 3 (three) times a day with meals      Facility-Administered Medications: None       Past Medical History:   Diagnosis Date   • Irregular heart beat    • Penile pain    • Viral warts     Resolved 8/6/2014        Past Surgical History:   Procedure Laterality Date   • HEMORROIDECTOMY  02/2022       Family History   Problem Relation Age of Onset   • Hypertension Father    • Heart disease Father         Leaky valve   • Hyperlipidemia Father         Pure hypercholesterolemia    • Arthritis Mother    • Colon cancer Maternal Grandfather    • Seizures Sister    • Albinism Sister    • Vision loss Sister      I have reviewed and agree with the history as documented      E-Cigarette/Vaping   • E-Cigarette Use Current Every Day User      E-Cigarette/Vaping Substances   • Nicotine Yes    • THC No    • CBD No    • Flavoring Yes    • Other No    • Unknown No      Social History     Tobacco Use   • Smoking status: Former     Packs/day: 0 00     Years: 0 00     Pack years: 0 00     Types: Cigarettes   • Smokeless tobacco: Former   • Tobacco comments:     vaping   Vaping Use   • Vaping Use: Every day   • Substances: Nicotine, Flavoring   Substance Use Topics   • Alcohol use: Yes     Alcohol/week: 6 0 standard drinks     Types: 6 Cans of beer per week     Comment: 3x a week   • Drug use: Not Currently     Types: Marijuana       Review of Systems   Constitutional: Negative for diaphoresis, fatigue and fever  HENT: Negative for congestion, sinus pressure, sinus pain, sneezing, sore throat, tinnitus and trouble swallowing  Respiratory: Negative for cough, chest tightness and shortness of breath  Cardiovascular: Positive for chest pain  Negative for palpitations and leg swelling  Gastrointestinal: Negative for abdominal pain, diarrhea, nausea and vomiting  Genitourinary: Negative for difficulty urinating, dysuria, flank pain and hematuria  Musculoskeletal: Negative for arthralgias, back pain, gait problem, neck pain and neck stiffness  Skin: Negative for color change, pallor and rash  Neurological: Negative for dizziness, light-headedness and headaches  Hematological: Negative for adenopathy  Does not bruise/bleed easily  Psychiatric/Behavioral: Negative for agitation and behavioral problems  Physical Exam  Physical Exam  Vitals and nursing note reviewed  Constitutional:       General: He is not in acute distress  Appearance: He is well-developed  He is not ill-appearing, toxic-appearing or diaphoretic  HENT:      Head: Normocephalic and atraumatic  Mouth/Throat:      Pharynx: No oropharyngeal exudate  Eyes:      Pupils: Pupils are equal, round, and reactive to light  Neck:      Thyroid: No thyromegaly  Vascular: No hepatojugular reflux or JVD  Trachea: No tracheal deviation  Cardiovascular:      Rate and Rhythm: Normal rate and regular rhythm        Pulses: Carotid pulses are 2+ on the right side and 2+ on the left side  Radial pulses are 2+ on the right side and 2+ on the left side  Dorsalis pedis pulses are 2+ on the right side and 2+ on the left side  Posterior tibial pulses are 2+ on the right side and 2+ on the left side  Heart sounds: Normal heart sounds  Heart sounds not distant  No murmur heard  No systolic murmur is present  No diastolic murmur is present  No friction rub  No gallop  No S3 or S4 sounds  Pulmonary:      Effort: Pulmonary effort is normal  No tachypnea, accessory muscle usage or respiratory distress  Breath sounds: Normal breath sounds  No stridor  No decreased breath sounds, wheezing, rhonchi or rales  Chest:      Chest wall: No tenderness  Abdominal:      General: Bowel sounds are normal  There is no distension or abdominal bruit  Palpations: Abdomen is soft  There is no fluid wave, hepatomegaly or mass  Tenderness: There is no abdominal tenderness  There is no guarding or rebound  Musculoskeletal:         General: No tenderness or deformity  Normal range of motion  Cervical back: Normal range of motion and neck supple  Right lower leg: No tenderness  No edema  Left lower leg: No tenderness  No edema  Lymphadenopathy:      Cervical: No cervical adenopathy  Skin:     General: Skin is warm and dry  Capillary Refill: Capillary refill takes less than 2 seconds  Coloration: Skin is not cyanotic or pale  Findings: No ecchymosis, erythema or rash  Nails: There is no clubbing  Neurological:      General: No focal deficit present  Mental Status: He is alert and oriented to person, place, and time     Psychiatric:         Behavior: Behavior normal          Vital Signs  ED Triage Vitals [05/08/23 1149]   Temperature Pulse Respirations Blood Pressure SpO2   97 6 °F (36 4 °C) (!) 50 16 (!) 180/108 100 %      Temp Source Heart Rate Source Patient Position - Orthostatic VS BP Location FiO2 (%)   Oral Monitor -- -- --      Pain Score       3           Vitals:    05/08/23 1149 05/08/23 1204   BP: (!) 180/108 141/91   Pulse: (!) 50 (!) 50         Visual Acuity      ED Medications  Medications - No data to display    Diagnostic Studies  Results Reviewed     Procedure Component Value Units Date/Time    HS Troponin 0hr (reflex protocol) [[id number]]  (Normal) Collected: 05/08/23 1204    Lab Status: Final result Specimen: Blood from Arm, Right Updated: 05/08/23 1246     hs TnI 0hr 3 ng/L     Comprehensive metabolic panel [[id number]]  (Abnormal) Collected: 05/08/23 1204    Lab Status: Final result Specimen: Blood from Arm, Right Updated: 05/08/23 1233     Sodium 138 mmol/L      Potassium 3 8 mmol/L      Chloride 102 mmol/L      CO2 26 mmol/L      ANION GAP 10 mmol/L      BUN 17 mg/dL      Creatinine 1 04 mg/dL      Glucose 101 mg/dL      Calcium 9 6 mg/dL      AST 26 U/L      ALT 59 U/L      Alkaline Phosphatase 40 U/L      Total Protein 7 2 g/dL      Albumin 4 4 g/dL      Total Bilirubin 0 50 mg/dL      eGFR 97 ml/min/1 73sq m     Narrative:      [place] guidelines for Chronic Kidney Disease (CKD):   •  Stage 1 with normal or high GFR (GFR > 90 mL/min/1 73 square meters)  •  Stage 2 Mild CKD (GFR = 60-89 mL/min/1 73 square meters)  •  Stage 3A Moderate CKD (GFR = 45-59 mL/min/1 73 square meters)  •  Stage 3B Moderate CKD (GFR = 30-44 mL/min/1 73 square meters)  •  Stage 4 Severe CKD (GFR = 15-29 mL/min/1 73 square meters)  •  Stage 5 End Stage CKD (GFR <15 mL/min/1 73 square meters)  Note: GFR calculation is accurate only with a steady state creatinine    CBC and differential [[id number]] Collected: 05/08/23 1204    Lab Status: Final result Specimen: Blood from Arm, Right Updated: 05/08/23 1211     WBC 6 78 Thousand/uL      RBC 5 27 Million/uL      Hemoglobin 15 4 g/dL      Hematocrit 45 3 %      MCV 86 fL      MCH 29 2 pg      MCHC 34 0 g/dL      RDW 12 6 %      MPV 11 3 fL      Platelets 988 Thousands/uL      nRBC 0 /100 WBCs      Neutrophils Relative 62 %      Immat GRANS % 0 %      Lymphocytes Relative 27 %      Monocytes Relative 9 %      Eosinophils Relative 2 %      Basophils Relative 0 %      Neutrophils Absolute 4 14 Thousands/µL      Immature Grans Absolute 0 01 Thousand/uL      Lymphocytes Absolute 1 85 Thousands/µL      Monocytes Absolute 0 64 Thousand/µL      Eosinophils Absolute 0 12 Thousand/µL      Basophils Absolute 0 02 Thousands/µL                  XR chest portable   Final Result by Jorge Doan MD (05/08 1240)      No acute cardiopulmonary disease  Findings are stable            Workstation performed: [id number]                    Procedures  ECG 12 Lead Documentation Only    Date/Time: 5/8/2023 12:23 PM  Performed by: David Sales MD  Authorized by: David Sales MD     Indications / Diagnosis:  Chest pain  ECG reviewed by me, the ED Provider: yes    Patient location:  ED and bedside  Previous ECG:     Previous ECG:  Unavailable  Interpretation:     Interpretation: abnormal    Rate:     ECG rate:  51    ECG rate assessment: bradycardic    Rhythm:     Rhythm: sinus bradycardia    Ectopy:     Ectopy: none    QRS:     QRS axis:  Normal    QRS intervals:  Normal  Conduction:     Conduction: normal    ST segments:     ST segments:  Normal  T waves:     T waves: normal    Comments:      Normal EKG except bradycardia  ED Course                               SBIRT 20yo+    Flowsheet Row Most Recent Value   Initial Alcohol Screen: US AUDIT-C     1  How often do you have a drink containing alcohol? 4 Filed at: 05/08/2023 1152   2  How many drinks containing alcohol do you have on a typical day you are drinking? 3 Filed at: 05/08/2023 1152   3a  Male UNDER 65: How often do you have five or more drinks on one occasion?  2 Filed at: 05/08/2023 1152   Audit-C Score 9 Filed at: 05/08/2023 1152   Full "Alcohol Screen: US AUDIT    4  How often during the last year have you found that you were not able to stop drinking once you had started? 0 Filed at: 05/08/2023 1152   5  How often during past year have you failed to do what was normally expected of you because of drinking? 0 Filed at: 05/08/2023 1152   6  How often in past year have you needed a first drink in the morning to get yourself going after a heavy drinking session? 0 Filed at: 05/08/2023 1152   7  How often in past year have you had feeling of guilt or remorse after drinking? 1 Filed at: 05/08/2023 1152   8  How often in past year have you been unable to remember what happened night before because you had been drinking? 0 Filed at: 05/08/2023 1152   9  Have you or someone else been injured as a result of your drinking? 0 Filed at: 05/08/2023 1152   10  Has a relative, friend, doctor or other health worker been concerned about your drinking and suggested you cut down?  0 Filed at: 05/08/2023 1152   AUDIT Total Score 10 Filed at: 05/08/2023 1152   RICARDO: How many times in the past year have you    Used an illegal drug or used a prescription medication for non-medical reasons? Once or Twice Filed at: 05/08/2023 1152   DAST-10: In the past 12 months       1  Have you used drugs other than those required for medical reasons? 1 Filed at: 05/08/2023 1152   2  Do you use more than one drug at a time? 0 Filed at: 05/08/2023 1152   3  Have you had medical problems as a result of your drug use (e g , memory loss, hepatitis, convulsions, bleeding, etc )? 0 Filed at: 05/08/2023 1152   4  Have you had \"blackouts\" or \"flashbacks\" as a result of drug use? YesNo 0 Filed at: 05/08/2023 1152   5  Do you ever feel bad or guilty about your drug use? 0 Filed at: 05/08/2023 1152   6  Does your spouse (or parent) ever complain about your involvement with drugs? 0 Filed at: 05/08/2023 1152   7  Have you neglected your family because of your use of drugs?  0 Filed at: " 05/08/2023 1152   8  Have you engaged in illegal activities in order to obtain drugs? 0 Filed at: 05/08/2023 1152   9  Have you ever experienced withdrawal symptoms (felt sick) when you stopped taking drugs? 0 Filed at: 05/08/2023 1152   10  Are you always able to stop using drugs when you want to? 0 Filed at: 05/08/2023 1152   DAST-10 Score 1 Filed at: 05/08/2023 1152                    Medical Decision Making  This is a 33years old came for having chest pain  Patient pointed to 1 area in the left side  Chest pain is not radiating anywhere  The pain is 4/10  Pain is pressure-like  Patient has no history of CAD  Patient takes no medication  Patient is non-smoker  Physical exam came back normal   EKG is normal except he was bradycardic with heart rate 51  Patient plays a lot of sports and go to gym  Labs came back normal   Troponin is3  CXR normal   At this time organ to discharge patient home all question concerns of patient have been fully addressed  DDX ; CHEST PAIN; musculoskeletal ,  Rib pain, stress, anxiety, pneumonia  Amount and/or Complexity of Data Reviewed  Labs: ordered  Details: WNL  Radiology: ordered  Details: CXR; No cardiopulmonary findings  ECG/medicine tests: ordered and independent interpretation performed  Details: NSR, NO ISCHEMIC CHANGES , NORMAL EKG RATE 51/MIN  Disposition  Final diagnoses:   Chest pain, unspecified type     Time reflects when diagnosis was documented in both MDM as applicable and the Disposition within this note     Time User Action Codes Description Comment    5/8/2023  1:27 PM Ginny Gonzáles Add [R07 9] Chest pain, unspecified type       ED Disposition     ED Disposition   Discharge    Condition   Stable    Date/Time   Mon May 8, 2023  1:27 PM    Comment   Lang Watkins discharge to home/self care                 Follow-up Information     Follow up With Specialties Details Why Contact Info Additional Information    [facility] [address] In 1 week  [address]  [address] [facility] [address] [phone] [address], [address] [facility], [place], [place], [address], [address]  Go to  As needed [address] [address]           Discharge Medication List as of 5/8/2023  1:28 PM      CONTINUE these medications which have NOT CHANGED    Details   hydrOXYzine HCL (ATARAX) 25 mg tablet Take 1 tablet (25 mg total) by mouth every 6 (six) hours as needed for anxiety, Starting Tue 9/13/2022, Normal      phenazopyridine (PYRIDIUM) 200 mg tablet Take 1 tablet (200 mg total) by mouth 3 (three) times a day with meals, Starting Tue 9/13/2022, Normal             No discharge procedures on file      PDMP Review     None          ED Provider  Electronically Signed by           Roselia Silveira MD  05/09/23 6175

## 2023-05-11 ENCOUNTER — OFFICE VISIT (OUTPATIENT)
Dept: FAMILY MEDICINE CLINIC | Facility: OTHER | Age: 28
End: 2023-05-11

## 2023-05-11 VITALS
BODY MASS INDEX: 22.75 KG/M2 | OXYGEN SATURATION: 98 % | DIASTOLIC BLOOD PRESSURE: 84 MMHG | HEART RATE: 64 BPM | RESPIRATION RATE: 15 BRPM | WEIGHT: 168 LBS | SYSTOLIC BLOOD PRESSURE: 124 MMHG | HEIGHT: 72 IN | TEMPERATURE: 97.6 F

## 2023-05-11 DIAGNOSIS — R07.89 CHEST TIGHTNESS: ICD-10-CM

## 2023-05-11 DIAGNOSIS — R06.02 SOB (SHORTNESS OF BREATH): ICD-10-CM

## 2023-05-11 DIAGNOSIS — R03.0 TRANSIENT HYPERTENSION: ICD-10-CM

## 2023-05-11 DIAGNOSIS — R00.1 BRADYCARDIA: ICD-10-CM

## 2023-05-11 DIAGNOSIS — Z86.79 HISTORY OF CARDIAC ARRHYTHMIA: Primary | ICD-10-CM

## 2023-05-11 PROBLEM — L25.5 RHUS DERMATITIS: Status: ACTIVE | Noted: 2017-06-26

## 2023-05-11 NOTE — PROGRESS NOTES
Name: Tyson Ramirez      : 1995      MRN: [id number]  Encounter Provider: Nereida Laird MD  Encounter Date: 2023   Encounter department: [address] [place] [address]      1  History of cardiac arrhythmia  -     Echo complete w/ contrast if indicated; Future; Expected date: 2023  -     Lipid panel; Future  -     Ambulatory referral to Cardiology; Future  -     Stress test only, exercise; Future; Expected date: 2023  -     TSH, 3rd generation with Free T4 reflex; Future  -     Toxicology screen, urine    2  Chest tightness  -     Echo complete w/ contrast if indicated; Future; Expected date: 2023  -     Lipid panel; Future  -     Ambulatory referral to Cardiology; Future  -     Stress test only, exercise; Future; Expected date: 2023  -     TSH, 3rd generation with Free T4 reflex; Future  -     Toxicology screen, urine    3  Transient hypertension  -     Echo complete w/ contrast if indicated; Future; Expected date: 2023  -     Lipid panel; Future  -     Ambulatory referral to Cardiology; Future  -     Stress test only, exercise; Future; Expected date: 2023  -     TSH, 3rd generation with Free T4 reflex; Future  -     Toxicology screen, urine    4  Bradycardia  -     Echo complete w/ contrast if indicated; Future; Expected date: 2023  -     Lipid panel; Future  -     Ambulatory referral to Cardiology; Future  -     Stress test only, exercise; Future; Expected date: 2023  -     TSH, 3rd generation with Free T4 reflex; Future  -     Toxicology screen, urine    5  SOB (shortness of breath)  -     Echo complete w/ contrast if indicated; Future; Expected date: 2023  -     Lipid panel; Future  -     Ambulatory referral to Cardiology; Future  -     Stress test only, exercise; Future; Expected date: 2023  -     TSH, 3rd generation with Free T4 reflex;  Future  -     Toxicology screen, urine      Mr Manas Sullivan is a 32 yo "male with a PMH of unclear \"irregular heart beat\" presenting for follow-up after ED visit for hypertensive urgency, bradycardia, and chest tightness  Plan:  - Patient signed release paperwork to obtain prior cardiology records  - Referral to Cardiology  - Echocardiogram  - Cardiac Stress Test   - TSH  - Lipids   - Urine drug screen     Subjective      Patient presents today for follow-up after ED visit  Patient reports that on Monday he was at work when he developed a headache and chest tightness around 9:30/10am  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 went to medical, where his BP was measured at 180/110  Patient then went to ED, where his BP was measured at 180/105  ED performed EKG which revealed sinus bradycardia (HR 50), and labs revealed largely unremarkable CBC, CMP, and negative troponin  Patient reports the original symptoms came on gradually and lasted about 48 hours before they started to improve  He also reports he is still having some residual tightness in his chest  Yesterday he had difficulty catching his breath when he attempted to work out  His headache has resolved, and he denies getting headaches frequently  This is the first time this has ever happened  However, he does have a history of an \"irregular heat beat\" that was diagnosed when he was 12years old  Patient reports he had a seizure after drinking an energy drink, at which time this irregular heart beat was diagnosed  He followed with pediatric cardiology until he was 22, and was then told he no longer needed to follow up  He denies any history of asthma or allergies  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, especially Covid about 1 week ago  Patient has been vaping nicotine for the last 5 years  He denies any recent change in brand, and always buys at [facility]  He dos occasionally smoke marijuana  He denies any other drug use  He drinks alcohol 3-4 times per week with friends, typically 3-4 beers each time   He was " recently prescribed atarax for anxiety when he was having some burning uretheral pain, however the atarax made him fall asleep so he hasn't taken it much  (Patient reports the dysuria was 2/2 dehydration )      Review of Systems   Constitutional: Negative for chills, diaphoresis, fatigue, fever and unexpected weight change  Eyes: Negative for photophobia and visual disturbance  Respiratory: Positive for chest tightness and shortness of breath  Cardiovascular: Negative for palpitations  Gastrointestinal: Negative for abdominal pain, constipation, diarrhea, nausea and vomiting  Endocrine: Negative for polydipsia and polyuria  Genitourinary: Negative for difficulty urinating  Musculoskeletal: Positive for back pain  Negative for arthralgias and myalgias  Skin: Negative for color change and rash  Allergic/Immunologic: Negative for environmental allergies, food allergies and immunocompromised state  Neurological: Negative for dizziness, light-headedness and headaches  Psychiatric/Behavioral: Negative for sleep disturbance  Current Outpatient Medications on File Prior to Visit   Medication Sig   • hydrOXYzine HCL (ATARAX) 25 mg tablet Take 1 tablet (25 mg total) by mouth every 6 (six) hours as needed for anxiety (Patient not taking: Reported on 5/11/2023)   • phenazopyridine (PYRIDIUM) 200 mg tablet Take 1 tablet (200 mg total) by mouth 3 (three) times a day with meals (Patient not taking: Reported on 5/11/2023)     Objective     /84   Pulse 64   Temp 97 6 °F (36 4 °C)   Resp 15   Ht 6' (1 829 m)   Wt 76 2 kg (168 lb)   SpO2 98%   BMI 22 78 kg/m²     Physical Exam  Vitals reviewed  Constitutional:       General: He is not in acute distress  Appearance: Normal appearance  He is normal weight  He is not ill-appearing, toxic-appearing or diaphoretic  HENT:      Head: Normocephalic and atraumatic  Nose: Nose normal  No congestion or rhinorrhea        Mouth/Throat: Mouth: Mucous membranes are moist    Eyes:      General: No scleral icterus  Right eye: No discharge  Left eye: No discharge  Conjunctiva/sclera: Conjunctivae normal    Cardiovascular:      Rate and Rhythm: Regular rhythm  Bradycardia present  Heart sounds: Normal heart sounds  No murmur heard  No friction rub  No gallop  Pulmonary:      Effort: Pulmonary effort is normal  No respiratory distress  Breath sounds: Normal breath sounds  No stridor  No wheezing, rhonchi or rales  Chest:      Chest wall: No tenderness  Abdominal:      General: Abdomen is flat  Bowel sounds are normal  There is no distension  Palpations: Abdomen is soft  Tenderness: There is no abdominal tenderness  There is no guarding  Musculoskeletal:         General: No swelling, tenderness, deformity or signs of injury  Cervical back: Neck supple  No tenderness  Right lower leg: No edema  Left lower leg: No edema  Lymphadenopathy:      Cervical: No cervical adenopathy  Skin:     General: Skin is warm and dry  Capillary Refill: Capillary refill takes less than 2 seconds  Coloration: Skin is not jaundiced or pale  Findings: No bruising, erythema, lesion or rash  Neurological:      Mental Status: He is alert  Motor: No weakness  Gait: Gait normal    Psychiatric:         Mood and Affect: Mood normal          Behavior: Behavior normal          Thought Content: Thought content normal          Judgment: Judgment normal         SARAI-7 Flowsheet Screening    Flowsheet Row Most Recent Value   Over the last 2 weeks, how often have you been bothered by any of the following problems?     Feeling nervous, anxious, or on edge 1   Not being able to stop or control worrying 1   Worrying too much about different things 0   Trouble relaxing 0   Being so restless that it is hard to sit still 0   Becoming easily annoyed or irritable 2   Feeling afraid as if something awful "might happen 1   SARAI-7 Total Score 5        Labs     Component      Latest Ref Rng & Units 5/8/2023   hs TnI 0hr      \"Refer to ACS Flowchart\"- see link ng/L 3     Component      Latest Ref Rng & Units 5/8/2023   Sodium      135 - 147 mmol/L 138   Potassium      3 5 - 5 3 mmol/L 3 8   Chloride      96 - 108 mmol/L 102   CO2      21 - 32 mmol/L 26   Anion Gap      4 - 13 mmol/L 10   BUN      5 - 25 mg/dL 17   Creatinine      0 60 - 1 30 mg/dL 1 04   Glucose, Random      65 - 140 mg/dL 101   Calcium      8 4 - 10 2 mg/dL 9 6   AST      13 - 39 U/L 26   ALT      7 - 52 U/L 59 (H)   Alkaline Phosphatase      34 - 104 U/L 40   Total Protein      6 4 - 8 4 g/dL 7 2   Albumin      3 5 - 5 0 g/dL 4 4   TOTAL BILIRUBIN      0 20 - 1 00 mg/dL 0 50   eGFR      ml/min/1 73sq m 97     Component      Latest Ref Rng & Units 5/8/2023   WBC      4 31 - 10 16 Thousand/uL 6 78   Red Blood Cell Count      3 88 - 5 62 Million/uL 5 27   Hemoglobin      12 0 - 17 0 g/dL 15 4   HCT      36 5 - 49 3 % 45 3   MCV      82 - 98 fL 86   MCH      26 8 - 34 3 pg 29 2   MCHC      31 4 - 37 4 g/dL 34 0   RDW      11 6 - 15 1 % 12 6   MPV      8 9 - 12 7 fL 11 3   Platelet Count      155 - 390 Thousands/uL 229   nRBC      /100 WBCs 0   Neutrophils %      43 - 75 % 62   Immat GRANS %      0 - 2 % 0   Lymphocytes Relative      14 - 44 % 27   Monocytes Relative      4 - 12 % 9   Eosinophils      0 - 6 % 2   Basophils Relative      0 - 1 % 0   Absolute Neutrophils      1 85 - 7 62 Thousands/µL 4 14   Immature Grans Absolute      0 00 - 0 20 Thousand/uL 0 01   Lymphocytes Absolute      0 60 - 4 47 Thousands/µL 1 85   Absolute Monocytes      0 17 - 1 22 Thousand/µL 0 64   Absolute Eosinophils      0 00 - 0 61 Thousand/µL 0 12   Basophils Absolute      0 00 - 0 10 Thousands/µL 0 02     Imaging     CHEST XRAY 5/8/2023     INDICATION:   chest pain      COMPARISON: 12/28/2020  EXAM PERFORMED/VIEWS:  XR CHEST PORTABLE  Images: " 2     FINDINGS:     Cardiomediastinal silhouette appears unremarkable      The lungs are clear    No pneumothorax or pleural effusion      Osseous structures appear within normal limits for patient age      IMPRESSION:     No acute cardiopulmonary disease      Findings are stable      Workstation performed: [id number]          Castro Watkins MD   [facility] PGY-1

## 2024-02-09 ENCOUNTER — HOSPITAL ENCOUNTER (EMERGENCY)
Facility: HOSPITAL | Age: 29
Discharge: HOME | End: 2024-02-09
Attending: EMERGENCY MEDICINE
Payer: COMMERCIAL

## 2024-02-09 ENCOUNTER — TELEPHONE (OUTPATIENT)
Age: 29
End: 2024-02-09

## 2024-02-09 ENCOUNTER — APPOINTMENT (EMERGENCY)
Dept: RADIOLOGY | Facility: HOSPITAL | Age: 29
End: 2024-02-09
Payer: COMMERCIAL

## 2024-02-09 VITALS
HEART RATE: 70 BPM | SYSTOLIC BLOOD PRESSURE: 129 MMHG | TEMPERATURE: 98.1 F | OXYGEN SATURATION: 98 % | DIASTOLIC BLOOD PRESSURE: 85 MMHG | RESPIRATION RATE: 16 BRPM

## 2024-02-09 DIAGNOSIS — M25.531 RIGHT WRIST PAIN: Primary | ICD-10-CM

## 2024-02-09 PROCEDURE — 73130 X-RAY EXAM OF HAND: CPT

## 2024-02-09 PROCEDURE — 99284 EMERGENCY DEPT VISIT MOD MDM: CPT | Performed by: EMERGENCY MEDICINE

## 2024-02-09 PROCEDURE — 99283 EMERGENCY DEPT VISIT LOW MDM: CPT

## 2024-02-09 PROCEDURE — 73110 X-RAY EXAM OF WRIST: CPT

## 2024-02-09 RX ORDER — NAPROXEN 500 MG/1
500 TABLET ORAL EVERY 12 HOURS PRN
Qty: 10 TABLET | Refills: 0 | Status: SHIPPED | OUTPATIENT
Start: 2024-02-09 | End: 2024-02-19

## 2024-02-09 RX ORDER — ACETAMINOPHEN 325 MG/1
975 TABLET ORAL ONCE
Status: COMPLETED | OUTPATIENT
Start: 2024-02-09 | End: 2024-02-09

## 2024-02-09 RX ADMIN — ACETAMINOPHEN 975 MG: 325 TABLET, FILM COATED ORAL at 05:58

## 2024-02-09 NOTE — TELEPHONE ENCOUNTER
Patient is being referred to a orthopedics. Please schedule accordingly.    [facility]   [phone]

## 2024-02-09 NOTE — DISCHARGE INSTRUCTIONS
Follow up with orthopedics/outpatient providers, and return to the emergency department for new or worsening symptoms.

## 2024-02-09 NOTE — ED PROVIDER NOTES
History  Chief Complaint   Patient presents with    Wrist Injury     Patient injured right wrist while weight lifting 5 days ago.  Pain with movement      Patient is a 28-year-old male seen in the emergency department with concern for right wrist pain over approximate the past 5 days.  Patient notes that pain began while weightlifting, while bracing his arms while doing leg curls.  Patient notes right wrist pain, worse with palpation.  Patient notes minimal improvement with NSAID medication at home.  Patient notes no weakness, numbness, or tingling.  Patient also notes chronic clicking of fingers of right hand for several years.  Patient notes no other definite trauma.  Patient notes no other definite clear exacerbating or alleviating factors for his symptoms.        Prior to Admission Medications   Prescriptions Last Dose Informant Patient Reported? Taking?   hydrOXYzine HCL (ATARAX) 25 mg tablet  Self No No   Sig: Take 1 tablet (25 mg total) by mouth every 6 (six) hours as needed for anxiety   Patient not taking: Reported on 5/11/2023   phenazopyridine (PYRIDIUM) 200 mg tablet  Self No No   Sig: Take 1 tablet (200 mg total) by mouth 3 (three) times a day with meals   Patient not taking: Reported on 5/11/2023      Facility-Administered Medications: None       Past Medical History:   Diagnosis Date    Irregular heart beat     Penile pain     Viral warts     Resolved 8/6/2014        Past Surgical History:   Procedure Laterality Date    HEMORROIDECTOMY  02/2022       Family History   Problem Relation Age of Onset    Hypertension Father     Heart disease Father         Leaky valve    Hyperlipidemia Father         Pure hypercholesterolemia     Arthritis Mother     Colon cancer Maternal Grandfather     Seizures Sister     Albinism Sister     Vision loss Sister      I have reviewed and agree with the history as documented.    E-Cigarette/Vaping    E-Cigarette Use Current Every Day User      E-Cigarette/Vaping Substances     Nicotine Yes     THC No     CBD No     Flavoring Yes     Other No     Unknown No      Social History     Tobacco Use    Smoking status: Former     Current packs/day: 0.00     Types: Cigarettes    Smokeless tobacco: Former    Tobacco comments:     vaping   Vaping Use    Vaping status: Every Day    Substances: Nicotine, Flavoring   Substance Use Topics    Alcohol use: Yes     Alcohol/week: 6.0 standard drinks of alcohol     Types: 6 Cans of beer per week     Comment: 3x a week    Drug use: Yes     Types: Marijuana       Review of Systems   Constitutional:  Negative for chills and fever.   HENT:  Negative for ear pain and sore throat.    Eyes:  Negative for pain and visual disturbance.   Respiratory:  Negative for cough and shortness of breath.    Cardiovascular:  Negative for chest pain and palpitations.   Gastrointestinal:  Negative for abdominal pain and vomiting.   Musculoskeletal:  Negative for back pain and neck stiffness.        Right wrist pain, right finger clicking   Skin:  Negative for color change and rash.   Neurological:  Negative for weakness and numbness.   Psychiatric/Behavioral:  Negative for agitation and confusion.        Physical Exam  Physical Exam  Vitals and nursing note reviewed.   Constitutional:       General: He is not in acute distress.     Appearance: He is well-developed.   HENT:      Head: Normocephalic and atraumatic.      Right Ear: External ear normal.      Left Ear: External ear normal.      Nose: Nose normal.      Mouth/Throat:      Pharynx: Oropharynx is clear.   Eyes:      General: No scleral icterus.     Conjunctiva/sclera: Conjunctivae normal.   Cardiovascular:      Rate and Rhythm: Normal rate.      Comments: well-perfused extremities  Pulmonary:      Effort: Pulmonary effort is normal. No respiratory distress.   Abdominal:      General: Abdomen is flat. There is no distension.   Musculoskeletal:         General: Tenderness present. No swelling or deformity.      Cervical  back: Normal range of motion and neck supple.      Comments: Mild tenderness to dorsal aspect of ulnar right wrist; intact range of motion of right wrist; no significant tenderness to palpation of right hand; normal exam of right elbow; neurovascular intact extremities   Skin:     General: Skin is warm and dry.   Neurological:      General: No focal deficit present.      Mental Status: He is alert.      Cranial Nerves: No cranial nerve deficit.      Sensory: No sensory deficit.   Psychiatric:         Mood and Affect: Mood normal.         Thought Content: Thought content normal.         Vital Signs  ED Triage Vitals   Temperature Pulse Respirations Blood Pressure SpO2   02/09/24 0540 02/09/24 0540 02/09/24 0540 02/09/24 0540 02/09/24 0540   98.1 °F (36.7 °C) 70 16 129/85 98 %      Temp Source Heart Rate Source Patient Position - Orthostatic VS BP Location FiO2 (%)   02/09/24 0540 02/09/24 0540 02/09/24 0540 02/09/24 0540 --   Oral Monitor Lying Left arm       Pain Score       02/09/24 0558       2           Vitals:    02/09/24 0540   BP: 129/85   Pulse: 70   Patient Position - Orthostatic VS: Lying         Visual Acuity      ED Medications  Medications   acetaminophen (TYLENOL) tablet 975 mg (975 mg Oral Given 2/9/24 0558)       Diagnostic Studies  Results Reviewed       None                   XR wrist 3+ views RIGHT   ED Interpretation by Hal Pérez MD (02/09 0603)   No acute fracture or dislocation      XR hand 3+ views RIGHT   ED Interpretation by Hal Pérez MD (02/09 0604)   No acute fracture or dislocation                 Procedures  Procedures         ED Course                               SBIRT 20yo+      Flowsheet Row Most Recent Value   Initial Alcohol Screen: US AUDIT-C     1. How often do you have a drink containing alcohol? 0 Filed at: 02/09/2024 0554   2. How many drinks containing alcohol do you have on a typical day you are drinking?  0 Filed at: 02/09/2024 0554   3a. Male UNDER 65: How  often do you have five or more drinks on one occasion? 0 Filed at: 02/09/2024 0554   Audit-C Score 0 Filed at: 02/09/2024 0554   RICARDO: How many times in the past year have you...    Used an illegal drug or used a prescription medication for non-medical reasons? Never Filed at: 02/09/2024 0554                      Medical Decision Making  Patient is a 28-year-old male seen in the emergency department with concern for right wrist pain.  Medication was ordered for symptom control.  X-rays of the right wrist/hand showed no acute fracture or dislocation. Evaluation is consistent with right wrist sprain. Right wrist brace(thumb spica) was ordered. Plan to have patient follow up with orthopedics/outpatient providers.  Patient stable for discharge home.  Discharge instructions were reviewed with patient.    Problems Addressed:  Right wrist pain: acute illness or injury    Amount and/or Complexity of Data Reviewed  Radiology: ordered and independent interpretation performed. Decision-making details documented in ED Course.    Risk  OTC drugs.  Prescription drug management.             Disposition  Final diagnoses:   Right wrist pain     Time reflects when diagnosis was documented in both MDM as applicable and the Disposition within this note       Time User Action Codes Description Comment    2/9/2024  5:44 AM Hal Pérez Add [M25.531] Right wrist pain           ED Disposition       ED Disposition   Discharge    Condition   Stable    Date/Time   Fri Feb 9, 2024 0607    Comment   Clyde A [place]one discharge to home/self care.                   Follow-up Information       Follow up With Specialties Details Why Contact Info Additional Information    Your primary doctor  Call in 1 day       [facility] Family [facility] Call  As needed [address] [place] [id number] [phone] [facility], [address] [place] [address], [address] [phone]    [facility]  [facility]thopedic [facility] Orthopedic Surgery Call  As needed [address] [id number] [phone] [facility] [address] [address] [phone]            Patient's Medications   Discharge Prescriptions    NAPROXEN (NAPROSYN) 500 MG TABLET    Take 1 tablet (500 mg total) by mouth every 12 (twelve) hours as needed (pain) for up to 10 days Take with food.       Start Date: 2/9/2024  End Date: 2/19/2024       Order Dose: 500 mg       Quantity: 10 tablet    Refills: 0           PDMP Review       None            ED Provider  Electronically Signed by             Hal Pérez MD  02/09/24 0608

## 2024-03-05 ENCOUNTER — HOSPITAL ENCOUNTER (EMERGENCY)
Facility: HOSPITAL | Age: 29
Discharge: HOME | End: 2024-03-05
Attending: EMERGENCY MEDICINE
Payer: OTHER MISCELLANEOUS

## 2024-03-05 VITALS
SYSTOLIC BLOOD PRESSURE: 159 MMHG | DIASTOLIC BLOOD PRESSURE: 93 MMHG | HEART RATE: 81 BPM | RESPIRATION RATE: 18 BRPM | TEMPERATURE: 98.1 F | OXYGEN SATURATION: 100 %
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
## 2024-03-05 DIAGNOSIS — S09.90XA INJURY OF HEAD, INITIAL ENCOUNTER: ICD-10-CM

## 2024-03-05 DIAGNOSIS — S01.81XA LACERATION OF FOREHEAD, INITIAL ENCOUNTER: Primary | ICD-10-CM

## 2024-03-05 PROCEDURE — 99282 EMERGENCY DEPT VISIT SF MDM: CPT

## 2024-03-05 PROCEDURE — 12011 RPR F/E/E/N/L/M 2.5 CM/<: CPT | Performed by: EMERGENCY MEDICINE

## 2024-03-05 PROCEDURE — 99284 EMERGENCY DEPT VISIT MOD MDM: CPT | Performed by: EMERGENCY MEDICINE

## 2024-03-05 NOTE — Clinical Note
Clyde Watkins was seen and treated in our emergency department on 3/5/2024.    No restrictions    Other - See Comments    None    Diagnosis: Small forehead laceration, fall, head injury without loss of consciousness    Clyde  may return to work on return date.    He may return on this date: 03/05/2024    Cleared to return to work today     If you have any questions or concerns, please don't hesitate to call.      Justin Figueroa MD    ______________________________           _______________          _______________  Hospital Representative                              Date                                Time

## 2024-03-05 NOTE — ED PROVIDER NOTES
History  Chief Complaint   Patient presents with    Facial Laceration     Pt states he was moving cabinets up stairs when he fell backwards down the stairs (Approx 13 steps) hitting his head on the ground,  then cabinet landed on his face. PTA 3 advil 7:45 Pm Negative for thinners.      28-year-old male resents to the ED for evaluation after fall with head injury. Review of the medical records reveals that the patient's tetanus vaccine is up-to-date, last administered Tdap 8/30/2019.        Prior to Admission Medications   Prescriptions Last Dose Informant Patient Reported? Taking?   hydrOXYzine HCL (ATARAX) 25 mg tablet  Self No No   Sig: Take 1 tablet (25 mg total) by mouth every 6 (six) hours as needed for anxiety   Patient not taking: Reported on 5/11/2023   naproxen (Naprosyn) 500 mg tablet   No No   Sig: Take 1 tablet (500 mg total) by mouth every 12 (twelve) hours as needed (pain) for up to 10 days Take with food.   phenazopyridine (PYRIDIUM) 200 mg tablet  Self No No   Sig: Take 1 tablet (200 mg total) by mouth 3 (three) times a day with meals   Patient not taking: Reported on 5/11/2023      Facility-Administered Medications: None       Past Medical History:   Diagnosis Date    Irregular heart beat     Penile pain     Viral warts     Resolved 8/6/2014        Past Surgical History:   Procedure Laterality Date    HEMORROIDECTOMY  02/2022       Family History   Problem Relation Age of Onset    Hypertension Father     Heart disease Father         Leaky valve    Hyperlipidemia Father         Pure hypercholesterolemia     Arthritis Mother     Colon cancer Maternal Grandfather     Seizures Sister     Albinism Sister     Vision loss Sister      I have reviewed and agree with the history as documented.    E-Cigarette/Vaping    E-Cigarette Use Current Every Day User      E-Cigarette/Vaping Substances    Nicotine Yes     THC No     CBD No     Flavoring Yes     Other No     Unknown No      Social History     Tobacco Use     Smoking status: Former     Current packs/day: 0.00     Types: Cigarettes    Smokeless tobacco: Former    Tobacco comments:     vaping   Vaping Use    Vaping status: Every Day    Substances: Nicotine, Flavoring   Substance Use Topics    Alcohol use: Yes     Alcohol/week: 6.0 standard drinks of alcohol     Types: 6 Cans of beer per week     Comment: 3x a week    Drug use: Not Currently     Types: Marijuana       Review of Systems    Physical Exam  Physical Exam    Vital Signs  ED Triage Vitals [03/05/24 0129]   Temperature Pulse Respirations Blood Pressure SpO2   98.1 °F (36.7 °C) 81 18 159/93 100 %      Temp Source Heart Rate Source Patient Position - Orthostatic VS BP Location FiO2 (%)   Oral Monitor Sitting Left arm --      Pain Score       --           Vitals:    03/05/24 0129   BP: 159/93   Pulse: 81   Patient Position - Orthostatic VS: Sitting         Visual Acuity  Visual Acuity      Flowsheet Row Most Recent Value   L Pupil Size (mm) 5   R Pupil Size (mm) 5            ED Medications  Medications - No data to display    Diagnostic Studies  Results Reviewed       None                   No orders to display              Procedures  Universal Protocol:  Consent: Verbal consent obtained.  Risks and benefits: risks, benefits and alternatives were discussed  Consent given by: patient  Laceration repair    Date/Time: 3/5/2024 2:17 AM    Performed by: Justin Figueroa MD  Authorized by: Justin Figueroa MD  Body area: head/neck  Location details: forehead  Laceration length: 0.5 cm  Foreign bodies: no foreign bodies      Procedure Details:  Preparation: Patient was prepped and draped in the usual sterile fashion.  Irrigation solution: saline  Irrigation method: syringe  Amount of cleaning: standard  Debridement: none  Degree of undermining: none  Skin closure: glue  Approximation: close  Approximation difficulty: simple  Patient tolerance: patient tolerated the procedure well with no immediate complications                ED Course                               SBIRT 22yo+      Flowsheet Row Most Recent Value   Initial Alcohol Screen: US AUDIT-C     1. How often do you have a drink containing alcohol? 0 Filed at: 03/05/2024 0130   2. How many drinks containing alcohol do you have on a typical day you are drinking?  0 Filed at: 03/05/2024 0130   3a. Male UNDER 65: How often do you have five or more drinks on one occasion? 0 Filed at: 03/05/2024 0130   Audit-C Score 0 Filed at: 03/05/2024 0130   RICARDO: How many times in the past year have you...    Used an illegal drug or used a prescription medication for non-medical reasons? Never Filed at: 03/05/2024 0130                      Medical Decision Making           Disposition  Final diagnoses:   Laceration of forehead, initial encounter   Fall, initial encounter   Injury of head, initial encounter     Time reflects when diagnosis was documented in both MDM as applicable and the Disposition within this note       Time User Action Codes Description Comment    3/5/2024  3:00 AM Justin Figueroa [S01.81XA] Laceration of forehead, initial encounter     3/5/2024  3:00 AM Justin Figueroa [W19.XXXA] Fall, initial encounter     3/5/2024  3:00 AM Justin Figueroa [S09.90XA] Injury of head, initial encounter           ED Disposition       ED Disposition   Discharge    Condition   Stable    Date/Time   Tue Mar 5, 2024 0300    Comment   Clyde A Sherrill discharge to home/self care.                   Follow-up Information       Follow up With Specialties Details Why Contact Info Additional Information    [facility] Emergency Department Emergency Medicine Go to  If symptoms worsen [address] [address]  [phone] [facility] Emergency Department, [address] [address]            Patient's Medications   Discharge Prescriptions    No medications on file       No discharge procedures on file.    PDMP Review       None             ED Provider  Electronically Signed by           filing cabinet up a staircase when he lost his balance and fell, falling backwards down the stairs.  He states that he struck his head against the ground but did not experience any loss of consciousness.  He notes that he was struck in the face by the metal cabinet and sustained a small laceration over the center of his forehead.  He denies any current headache, visual changes, nausea, vomiting, or confusion.  No other symptoms or complaints.  He is not on any anticoagulation medications.  Review of the medical records reveals that the patient's tetanus vaccine is up-to-date, last administered Tdap 8/30/2019.    Vital signs reviewed.  See physical exam documentation for exam findings.  Differential diagnosis includes but is not limited to fall with forehead laceration.  No high risk factors on history or examination the patient is cleared by clinical criteria.  No indications for advanced imaging at this time.  Forehead laceration cleaned and repaired with glue as documented in procedure note. I discussed all findings, treatment, red flags/return precautions, and outpatient follow-up and the patient/family understand and agree. Stable for discharge.             Disposition  Final diagnoses:   Laceration of forehead, initial encounter   Fall, initial encounter   Injury of head, initial encounter     Time reflects when diagnosis was documented in both MDM as applicable and the Disposition within this note       Time User Action Codes Description Comment    3/5/2024  3:00 AM Justin Figueroa [S01.81XA] Laceration of forehead, initial encounter     3/5/2024  3:00 AM Justin Figueroa [W19.XXXA] Fall, initial encounter     3/5/2024  3:00 AM Justin Figueroa [S09.90XA] Injury of head, initial encounter           ED Disposition       ED Disposition   Discharge    Condition   Stable    Date/Time   Tue Mar 5, 2024 0300    Comment   Clyde A Hector discharge to home/self care.                   Follow-up Information        Follow up With Specialties Details Why Contact Info Additional Information    [facility] Emergency Department Emergency Medicine Go to  If symptoms worsen [address] [place] [id number] [phone] [facility] Emergency Department, [address] [place] [id number]            Patient's Medications   Discharge Prescriptions    No medications on file       No discharge procedures on file.    PDMP Review       None            ED Provider  Electronically Signed by             Justin Figueroa MD  03/18/24 7700

## 2024-12-19 ENCOUNTER — TELEPHONE (OUTPATIENT)
Age: 29
End: 2024-12-19

## 2024-12-19 NOTE — TELEPHONE ENCOUNTER
Spoke with patient and he will call back to schedule said he has to see what his schedule will be

## 2025-08-05 ENCOUNTER — OFFICE VISIT (OUTPATIENT)
Dept: FAMILY MEDICINE CLINIC | Facility: CLINIC | Age: 30
End: 2025-08-05
Payer: COMMERCIAL

## 2025-08-05 VITALS
WEIGHT: 191 LBS | HEIGHT: 72 IN | DIASTOLIC BLOOD PRESSURE: 82 MMHG | BODY MASS INDEX: 25.87 KG/M2 | OXYGEN SATURATION: 98 % | SYSTOLIC BLOOD PRESSURE: 122 MMHG | HEART RATE: 80 BPM

## 2025-08-05 DIAGNOSIS — Z13.220 SCREENING FOR LIPID DISORDERS: ICD-10-CM

## 2025-08-05 DIAGNOSIS — N48.1 BALANITIS: Primary | ICD-10-CM

## 2025-08-05 DIAGNOSIS — Z13.1 SCREENING FOR DIABETES MELLITUS: ICD-10-CM

## 2025-08-05 PROCEDURE — 99214 OFFICE O/P EST MOD 30 MIN: CPT

## 2025-08-05 RX ORDER — CLOTRIMAZOLE AND BETAMETHASONE DIPROPIONATE 10; .5 MG/ML; MG/ML
1 LOTION TOPICAL 2 TIMES DAILY
Qty: 30 ML | Refills: 0 | Status: SHIPPED | OUTPATIENT
Start: 2025-08-05 | End: 2025-08-19

## 2025-08-05 RX ORDER — DOXYCYCLINE 100 MG/1
1 CAPSULE ORAL 2 TIMES DAILY
COMMUNITY
Start: 2025-07-29

## 2025-08-07 ENCOUNTER — TELEPHONE (OUTPATIENT)
Dept: FAMILY MEDICINE CLINIC | Facility: CLINIC | Age: 30
End: 2025-08-07

## 2025-08-08 ENCOUNTER — APPOINTMENT (OUTPATIENT)
Dept: LAB | Facility: CLINIC | Age: 30
End: 2025-08-08
Payer: COMMERCIAL

## 2025-08-08 LAB
CHOLEST SERPL-MCNC: 168 MG/DL (ref ?–200)
HDLC SERPL-MCNC: 47 MG/DL
HSV1 DNA SPEC QL NAA+PROBE: NORMAL
LDLC SERPL CALC-MCNC: 104 MG/DL (ref 0–100)
SPECIMEN(S) RECEIVED: NORMAL
T VAGINALIS SPEC QL WET PREP: NORMAL
TRIGL SERPL-MCNC: 87 MG/DL (ref ?–150)

## 2025-08-08 PROCEDURE — 80061 LIPID PANEL: CPT

## 2025-08-14 LAB
HSV1 DNA SPEC QL NAA+PROBE: NORMAL
SPECIMEN(S) RECEIVED: NORMAL
T VAGINALIS SPEC QL WET PREP: NORMAL
